# Patient Record
Sex: FEMALE | Race: BLACK OR AFRICAN AMERICAN | URBAN - METROPOLITAN AREA
[De-identification: names, ages, dates, MRNs, and addresses within clinical notes are randomized per-mention and may not be internally consistent; named-entity substitution may affect disease eponyms.]

---

## 2017-05-22 ENCOUNTER — OUTPATIENT (OUTPATIENT)
Dept: OUTPATIENT SERVICES | Facility: HOSPITAL | Age: 52
LOS: 1 days | Discharge: HOME | End: 2017-05-22

## 2017-05-22 ENCOUNTER — APPOINTMENT (OUTPATIENT)
Dept: OBGYN | Facility: CLINIC | Age: 52
End: 2017-05-22

## 2017-05-22 VITALS
BODY MASS INDEX: 30.61 KG/M2 | WEIGHT: 195 LBS | SYSTOLIC BLOOD PRESSURE: 110 MMHG | DIASTOLIC BLOOD PRESSURE: 70 MMHG | HEIGHT: 67 IN

## 2017-05-26 ENCOUNTER — RESULT REVIEW (OUTPATIENT)
Age: 52
End: 2017-05-26

## 2017-06-01 LAB — HPV E6+E7 MRNA CVX QL NAA+PROBE: NOT DETECTED

## 2017-06-28 DIAGNOSIS — Z01.419 ENCOUNTER FOR GYNECOLOGICAL EXAMINATION (GENERAL) (ROUTINE) WITHOUT ABNORMAL FINDINGS: ICD-10-CM

## 2018-06-12 ENCOUNTER — OUTPATIENT (OUTPATIENT)
Dept: OUTPATIENT SERVICES | Facility: HOSPITAL | Age: 53
LOS: 1 days | Discharge: HOME | End: 2018-06-12

## 2018-06-12 DIAGNOSIS — Z12.31 ENCOUNTER FOR SCREENING MAMMOGRAM FOR MALIGNANT NEOPLASM OF BREAST: ICD-10-CM

## 2018-06-20 ENCOUNTER — OUTPATIENT (OUTPATIENT)
Dept: OUTPATIENT SERVICES | Facility: HOSPITAL | Age: 53
LOS: 1 days | Discharge: HOME | End: 2018-06-20

## 2018-06-20 DIAGNOSIS — R92.8 OTHER ABNORMAL AND INCONCLUSIVE FINDINGS ON DIAGNOSTIC IMAGING OF BREAST: ICD-10-CM

## 2020-07-23 ENCOUNTER — APPOINTMENT (OUTPATIENT)
Dept: INTERNAL MEDICINE | Facility: CLINIC | Age: 55
End: 2020-07-23
Payer: MEDICARE

## 2020-07-23 ENCOUNTER — OUTPATIENT (OUTPATIENT)
Dept: OUTPATIENT SERVICES | Facility: HOSPITAL | Age: 55
LOS: 1 days | Discharge: HOME | End: 2020-07-23

## 2020-07-23 VITALS
HEIGHT: 67 IN | HEART RATE: 76 BPM | RESPIRATION RATE: 16 BRPM | DIASTOLIC BLOOD PRESSURE: 86 MMHG | SYSTOLIC BLOOD PRESSURE: 124 MMHG | WEIGHT: 160 LBS | BODY MASS INDEX: 25.11 KG/M2

## 2020-07-23 PROCEDURE — 99212 OFFICE O/P EST SF 10 MIN: CPT | Mod: GC

## 2020-07-23 RX ORDER — LEVOTHYROXINE SODIUM 137 UG/1
TABLET ORAL
Refills: 0 | Status: DISCONTINUED | COMMUNITY
End: 2020-07-23

## 2020-07-23 NOTE — REVIEW OF SYSTEMS
[Abdominal Pain] : abdominal pain [Constipation] : constipation [Dizziness] : dizziness [Nl] : Respiratory

## 2020-08-05 LAB
25(OH)D3 SERPL-MCNC: 63 NG/ML
ALBUMIN SERPL ELPH-MCNC: 4.3 G/DL
ALP BLD-CCNC: 58 U/L
ALT SERPL-CCNC: 10 U/L
ANION GAP SERPL CALC-SCNC: 17 MMOL/L
AST SERPL-CCNC: 15 U/L
BASOPHILS # BLD AUTO: 0.09 K/UL
BASOPHILS NFR BLD AUTO: 0.9 %
BILIRUB SERPL-MCNC: 0.4 MG/DL
BUN SERPL-MCNC: 14 MG/DL
CALCIUM SERPL-MCNC: 9.4 MG/DL
CHLORIDE SERPL-SCNC: 100 MMOL/L
CHOLEST SERPL-MCNC: 158 MG/DL
CHOLEST/HDLC SERPL: 2.1 RATIO
CO2 SERPL-SCNC: 24 MMOL/L
CREAT SERPL-MCNC: 0.8 MG/DL
EOSINOPHIL # BLD AUTO: 0.15 K/UL
EOSINOPHIL NFR BLD AUTO: 1.5 %
ESTIMATED AVERAGE GLUCOSE: 108 MG/DL
GLUCOSE SERPL-MCNC: 85 MG/DL
HBA1C MFR BLD HPLC: 5.4 %
HCT VFR BLD CALC: 40 %
HDLC SERPL-MCNC: 75 MG/DL
HGB BLD-MCNC: 12.3 G/DL
HPV HIGH+LOW RISK DNA PNL CVX: NOT DETECTED
IMM GRANULOCYTES NFR BLD AUTO: 0.4 %
LDLC SERPL CALC-MCNC: 79 MG/DL
LYMPHOCYTES # BLD AUTO: 2.6 K/UL
LYMPHOCYTES NFR BLD AUTO: 26 %
MAN DIFF?: NORMAL
MCHC RBC-ENTMCNC: 29.1 PG
MCHC RBC-ENTMCNC: 30.8 G/DL
MCV RBC AUTO: 94.6 FL
MONOCYTES # BLD AUTO: 0.63 K/UL
MONOCYTES NFR BLD AUTO: 6.3 %
NEUTROPHILS # BLD AUTO: 6.5 K/UL
NEUTROPHILS NFR BLD AUTO: 64.9 %
PLATELET # BLD AUTO: 294 K/UL
POTASSIUM SERPL-SCNC: 4.1 MMOL/L
PROT SERPL-MCNC: 7.1 G/DL
RBC # BLD: 4.23 M/UL
RBC # FLD: 12.9 %
SODIUM SERPL-SCNC: 141 MMOL/L
TRIGL SERPL-MCNC: 55 MG/DL
TSH SERPL-ACNC: 0.14 UIU/ML
WBC # FLD AUTO: 10.01 K/UL

## 2020-09-03 NOTE — HISTORY OF PRESENT ILLNESS
[Menstrual Problems] : reports abnormal menses [Up to Date] : up to date with ~his/her~ STD screening [Currently In Menopause] : currently in menopause [Fever] : no fever [Vaginal Bleeding] : no vaginal bleeding [Burning] : no burning [Itching] : no itching [Hot Flashes] : no hot flashes [Night Sweats] : no night sweats [Vaginal Itching] : no vaginal itching [Dyspareunia] : no dyspareunia [Depression] : no depression [Anxiety] : no anxiety

## 2021-01-08 ENCOUNTER — APPOINTMENT (OUTPATIENT)
Dept: INTERNAL MEDICINE | Facility: CLINIC | Age: 56
End: 2021-01-08
Payer: COMMERCIAL

## 2021-01-08 ENCOUNTER — OUTPATIENT (OUTPATIENT)
Dept: OUTPATIENT SERVICES | Facility: HOSPITAL | Age: 56
LOS: 1 days | Discharge: HOME | End: 2021-01-08
Payer: MEDICAID

## 2021-01-08 ENCOUNTER — OUTPATIENT (OUTPATIENT)
Dept: OUTPATIENT SERVICES | Facility: HOSPITAL | Age: 56
LOS: 1 days | Discharge: HOME | End: 2021-01-08

## 2021-01-08 ENCOUNTER — RESULT REVIEW (OUTPATIENT)
Age: 56
End: 2021-01-08

## 2021-01-08 VITALS
TEMPERATURE: 98.2 F | HEIGHT: 67 IN | OXYGEN SATURATION: 98 % | BODY MASS INDEX: 25.27 KG/M2 | DIASTOLIC BLOOD PRESSURE: 87 MMHG | SYSTOLIC BLOOD PRESSURE: 128 MMHG | WEIGHT: 161 LBS | HEART RATE: 64 BPM

## 2021-01-08 DIAGNOSIS — N89.8 OTHER SPECIFIED NONINFLAMMATORY DISORDERS OF VAGINA: ICD-10-CM

## 2021-01-08 DIAGNOSIS — Z12.31 ENCOUNTER FOR SCREENING MAMMOGRAM FOR MALIGNANT NEOPLASM OF BREAST: ICD-10-CM

## 2021-01-08 PROCEDURE — 77063 BREAST TOMOSYNTHESIS BI: CPT | Mod: 26

## 2021-01-08 PROCEDURE — 77067 SCR MAMMO BI INCL CAD: CPT | Mod: 26

## 2021-01-08 PROCEDURE — 99213 OFFICE O/P EST LOW 20 MIN: CPT | Mod: GC

## 2021-01-08 RX ADMIN — DIPHENHYDRAMINE HYDROCHLORIDE 1 MG: 25 TABLET, FILM COATED ORAL at 00:00

## 2021-01-08 NOTE — ASSESSMENT
[FreeTextEntry1] : 56 yo female with history of thyroidectomy s/p hypothyroidism, asthma, uterine fibroids sp hysterectomy\par presented to the clinic for routine health care\par \par \par # Hypothyroidism\par - on levothyroxine 125 micrograms\par - TSH 0.14 july 2020\par - repeat TFTs\par - endocrin referral\par \par # Vit D deficiency \par -on supplement; stopped\par - Vit D normal\par \par # history of childhood asthma\par \par - not on current treatment\par - no symptoms currently\par \par \par Lipid panel HbA1c normal\par Declined vaccination\par Mammogram referral\par Gi referral for colonoscopy

## 2021-01-08 NOTE — HISTORY OF PRESENT ILLNESS
[Menstrual Problems] : reports abnormal menses [Up to Date] : up to date with ~his/her~ STD screening [Currently In Menopause] : currently in menopause [de-identified] : 56 yo female with history of thyroidectomy s/p hypothyroidism, asthma, uterine fibroids sp hysterectomy\par presented to the clinic for routine health care\par Patient is feeling well except for mild headache and constipation [Fever] : no fever [Vaginal Bleeding] : no vaginal bleeding [Burning] : no burning [Itching] : no itching [Hot Flashes] : no hot flashes [Night Sweats] : no night sweats [Vaginal Itching] : no vaginal itching [Dyspareunia] : no dyspareunia [Depression] : no depression [Anxiety] : no anxiety

## 2021-01-08 NOTE — REVIEW OF SYSTEMS
[Abdominal Pain] : abdominal pain [Dizziness] : dizziness [Nl] : Neurological [Constipation] : constipation [Headache] : headache [Negative] : Musculoskeletal [FreeTextEntry8] : vaginal itching

## 2021-01-12 DIAGNOSIS — E03.9 HYPOTHYROIDISM, UNSPECIFIED: ICD-10-CM

## 2021-01-12 DIAGNOSIS — J45.909 UNSPECIFIED ASTHMA, UNCOMPLICATED: ICD-10-CM

## 2021-01-12 DIAGNOSIS — E55.9 VITAMIN D DEFICIENCY, UNSPECIFIED: ICD-10-CM

## 2021-01-12 DIAGNOSIS — Z00.00 ENCOUNTER FOR GENERAL ADULT MEDICAL EXAMINATION WITHOUT ABNORMAL FINDINGS: ICD-10-CM

## 2021-01-12 DIAGNOSIS — G47.00 INSOMNIA, UNSPECIFIED: ICD-10-CM

## 2021-01-19 ENCOUNTER — TRANSCRIPTION ENCOUNTER (OUTPATIENT)
Age: 56
End: 2021-01-19

## 2021-01-19 ENCOUNTER — APPOINTMENT (OUTPATIENT)
Dept: GASTROENTEROLOGY | Facility: CLINIC | Age: 56
End: 2021-01-19
Payer: COMMERCIAL

## 2021-01-19 LAB
ALBUMIN SERPL ELPH-MCNC: 4.6 G/DL
ALP BLD-CCNC: 47 U/L
ALT SERPL-CCNC: 16 U/L
ANION GAP SERPL CALC-SCNC: 11 MMOL/L
AST SERPL-CCNC: 21 U/L
BILIRUB SERPL-MCNC: 0.4 MG/DL
BUN SERPL-MCNC: 16 MG/DL
CALCIUM SERPL-MCNC: 9.7 MG/DL
CHLORIDE SERPL-SCNC: 103 MMOL/L
CO2 SERPL-SCNC: 25 MMOL/L
CREAT SERPL-MCNC: 0.9 MG/DL
ESTIMATED AVERAGE GLUCOSE: 111 MG/DL
GLUCOSE SERPL-MCNC: 91 MG/DL
HBA1C MFR BLD HPLC: 5.5 %
POTASSIUM SERPL-SCNC: 4.4 MMOL/L
PROT SERPL-MCNC: 7.2 G/DL
SODIUM SERPL-SCNC: 139 MMOL/L
T3FREE SERPL-MCNC: 2.88 PG/ML
T4 FREE SERPL-MCNC: 2 NG/DL
TSH SERPL-ACNC: 0.18 UIU/ML

## 2021-01-19 PROCEDURE — 99213 OFFICE O/P EST LOW 20 MIN: CPT | Mod: 95,GE

## 2021-01-19 RX ORDER — POLYETHYLENE GLYCOL 3350 17 G/17G
17 POWDER, FOR SOLUTION ORAL TWICE DAILY
Qty: 60 | Refills: 4 | Status: ACTIVE | COMMUNITY
Start: 2021-01-19 | End: 1900-01-01

## 2021-01-19 NOTE — HISTORY OF PRESENT ILLNESS
[de-identified] : 54 yo F average risk for CRC. Previous colonoscopy ~9 years ago not done at [facility] byt reports polyps "but normal" ?non adenomatous?\par Denies weight loss, hematochezia, hematemesis, dysphagia,GERD.\par States constipation every once in a while.\par No family Hx of CRC.\par Labs reviewed\par No DAPT

## 2021-01-19 NOTE — ASSESSMENT
[FreeTextEntry1] : 56 yo F average risks CRC? vs low risk adenoma? unclear prior colonoscopy findings. Constipation.\par \par Plan:\par - Colonoscopy\par - Miralax 17 G Qd to BID\par - Follow up post colonoscopy

## 2021-01-20 ENCOUNTER — OUTPATIENT (OUTPATIENT)
Dept: OUTPATIENT SERVICES | Facility: HOSPITAL | Age: 56
LOS: 1 days | Discharge: HOME | End: 2021-01-20

## 2021-01-20 DIAGNOSIS — K59.00 CONSTIPATION, UNSPECIFIED: ICD-10-CM

## 2021-02-05 ENCOUNTER — NON-APPOINTMENT (OUTPATIENT)
Age: 56
End: 2021-02-05

## 2021-02-18 ENCOUNTER — NON-APPOINTMENT (OUTPATIENT)
Age: 56
End: 2021-02-18

## 2021-03-08 ENCOUNTER — APPOINTMENT (OUTPATIENT)
Dept: INTERNAL MEDICINE | Facility: CLINIC | Age: 56
End: 2021-03-08

## 2021-03-08 LAB
BASOPHILS # BLD AUTO: 0.09 K/UL
BASOPHILS NFR BLD AUTO: 1.2 %
EOSINOPHIL # BLD AUTO: 0.11 K/UL
EOSINOPHIL NFR BLD AUTO: 1.5 %
HCT VFR BLD CALC: 40.2 %
HGB BLD-MCNC: 13.1 G/DL
IMM GRANULOCYTES NFR BLD AUTO: 0.1 %
LYMPHOCYTES # BLD AUTO: 2.32 K/UL
LYMPHOCYTES NFR BLD AUTO: 31.4 %
MAN DIFF?: NORMAL
MCHC RBC-ENTMCNC: 29.1 PG
MCHC RBC-ENTMCNC: 32.6 G/DL
MCV RBC AUTO: 89.3 FL
MONOCYTES # BLD AUTO: 0.41 K/UL
MONOCYTES NFR BLD AUTO: 5.6 %
NEUTROPHILS # BLD AUTO: 4.44 K/UL
NEUTROPHILS NFR BLD AUTO: 60.2 %
PLATELET # BLD AUTO: 311 K/UL
RBC # BLD: 4.5 M/UL
RBC # FLD: 12.9 %
WBC # FLD AUTO: 7.38 K/UL

## 2021-03-10 ENCOUNTER — OUTPATIENT (OUTPATIENT)
Dept: OUTPATIENT SERVICES | Facility: HOSPITAL | Age: 56
LOS: 1 days | Discharge: HOME | End: 2021-03-10

## 2021-03-10 ENCOUNTER — APPOINTMENT (OUTPATIENT)
Dept: ENDOCRINOLOGY | Facility: CLINIC | Age: 56
End: 2021-03-10

## 2021-03-10 NOTE — HISTORY OF PRESENT ILLNESS
[FreeTextEntry1] : patient had a total thyroidectomy in the past, posssibly at [facility].

## 2021-03-10 NOTE — REASON FOR VISIT
[Consultation] : a consultation visit [Hypothyroidism] : hypothyroidism [FreeTextEntry2] : primary care.

## 2021-03-11 LAB — TSH SERPL-ACNC: 0.47 UIU/ML

## 2021-03-15 ENCOUNTER — OUTPATIENT (OUTPATIENT)
Dept: OUTPATIENT SERVICES | Facility: HOSPITAL | Age: 56
LOS: 1 days | Discharge: HOME | End: 2021-03-15

## 2021-03-15 ENCOUNTER — LABORATORY RESULT (OUTPATIENT)
Age: 56
End: 2021-03-15

## 2021-03-15 DIAGNOSIS — Z11.59 ENCOUNTER FOR SCREENING FOR OTHER VIRAL DISEASES: ICD-10-CM

## 2021-03-18 ENCOUNTER — RESULT REVIEW (OUTPATIENT)
Age: 56
End: 2021-03-18

## 2021-03-18 ENCOUNTER — TRANSCRIPTION ENCOUNTER (OUTPATIENT)
Age: 56
End: 2021-03-18

## 2021-03-18 ENCOUNTER — OUTPATIENT (OUTPATIENT)
Dept: OUTPATIENT SERVICES | Facility: HOSPITAL | Age: 56
LOS: 1 days | Discharge: HOME | End: 2021-03-18
Payer: COMMERCIAL

## 2021-03-18 VITALS
DIASTOLIC BLOOD PRESSURE: 85 MMHG | RESPIRATION RATE: 18 BRPM | OXYGEN SATURATION: 100 % | SYSTOLIC BLOOD PRESSURE: 157 MMHG | HEART RATE: 66 BPM

## 2021-03-18 VITALS
HEIGHT: 66 IN | RESPIRATION RATE: 18 BRPM | SYSTOLIC BLOOD PRESSURE: 121 MMHG | HEART RATE: 106 BPM | WEIGHT: 153 LBS | TEMPERATURE: 98 F | DIASTOLIC BLOOD PRESSURE: 84 MMHG

## 2021-03-18 DIAGNOSIS — Z90.711 ACQUIRED ABSENCE OF UTERUS WITH REMAINING CERVICAL STUMP: Chronic | ICD-10-CM

## 2021-03-18 DIAGNOSIS — E89.0 POSTPROCEDURAL HYPOTHYROIDISM: Chronic | ICD-10-CM

## 2021-03-18 PROCEDURE — 45385 COLONOSCOPY W/LESION REMOVAL: CPT

## 2021-03-18 PROCEDURE — 88305 TISSUE EXAM BY PATHOLOGIST: CPT | Mod: 26

## 2021-03-18 PROCEDURE — 45380 COLONOSCOPY AND BIOPSY: CPT | Mod: XU

## 2021-03-18 RX ORDER — LEVOTHYROXINE SODIUM 125 MCG
1 TABLET ORAL
Qty: 0 | Refills: 0 | DISCHARGE

## 2021-03-18 NOTE — H&P PST ADULT - HISTORY OF PRESENT ILLNESS
55 year female patient is here for colonoscopy , patient mentioned intermittent streaks of  fresh blood per rectum for several weeks

## 2021-03-18 NOTE — CHART NOTE - NSCHARTNOTEFT_GEN_A_CORE
PACU ANESTHESIA ADMISSION NOTE      Procedure: colonoscopy  Post op diagnosis:      ____  Intubated  TV:______       Rate: ______      FiO2: ______    _x___  Patent Airway    _x___  Full return of protective reflexes    _x___  Full recovery from anesthesia / back to baseline status    Vitals  SPO2:-98% on room air  HR:-88  RR:-14  B.P:-118/78      Mental Status:  _x___ Awake   ___x_ Alert   _____ Drowsy   _____ Sedated    Nausea/Vomiting:  _x___  NO       ______Yes,   See Post - Op Orders         Pain Scale (0-10):  __0___    Treatment: _x___ None    __x__ See Post - Op/PCA Orders    Post - Operative Fluids:   ___ Oral   ____x See Post - Op Orders    Plan: Discharge:   __x__Home       ____Floor     _____Critical Care    _____  Other:_________________    Comments:  Report endorsed to RN in pacu  Vitals stable  No anesthesia issues or complications noted.  Discharge to patient to / home when criteria met.

## 2021-03-19 ENCOUNTER — APPOINTMENT (OUTPATIENT)
Dept: INTERNAL MEDICINE | Facility: CLINIC | Age: 56
End: 2021-03-19
Payer: COMMERCIAL

## 2021-03-19 ENCOUNTER — OUTPATIENT (OUTPATIENT)
Dept: OUTPATIENT SERVICES | Facility: HOSPITAL | Age: 56
LOS: 1 days | Discharge: HOME | End: 2021-03-19

## 2021-03-19 ENCOUNTER — NON-APPOINTMENT (OUTPATIENT)
Age: 56
End: 2021-03-19

## 2021-03-19 DIAGNOSIS — Z90.711 ACQUIRED ABSENCE OF UTERUS WITH REMAINING CERVICAL STUMP: Chronic | ICD-10-CM

## 2021-03-19 DIAGNOSIS — E89.0 POSTPROCEDURAL HYPOTHYROIDISM: Chronic | ICD-10-CM

## 2021-03-19 PROBLEM — E03.9 HYPOTHYROIDISM, UNSPECIFIED: Chronic | Status: ACTIVE | Noted: 2021-03-18

## 2021-03-19 PROCEDURE — ZZZZZ: CPT

## 2021-03-19 RX ORDER — TERCONAZOLE 4 MG/G
0.4 CREAM VAGINAL
Qty: 1 | Refills: 1 | Status: DISCONTINUED | COMMUNITY
Start: 2021-01-08 | End: 2021-03-19

## 2021-03-19 NOTE — DISCUSSION/SUMMARY
[Home] : at home, [unfilled] , at the time of the visit. [Medical Office: ([facility])___] : at the medical office located in  [Verbal consent obtained from patient] : the patient, [unfilled] [FreeTextEntry1] : 56 yo female with history of thyroidectomy s/p hypothyroidism, asthma, uterine fibroids sp hysterectomy\par called from clinic for scheduled follow up.  Patient reports she is doing well. Reports she had coloscopy 3/18 and was told 3 polyps were removed and had 3 large hemorrhoids with virtual follow up 4/9/21. Scheduled for Dr. Holland for hemorrhoids surgery evaluation 4/6/21. \par \par Patient reports she has had a cold sore on nose that she has had since she was young.\par \par She is reporting a sulfur medication which cause her to break out. she reports she does not experience anaphylaxis or closed throat. \par \par 56 yo female with history of thyroidectomy s/p hypothyroidism, asthma, uterine fibroids s/p hysterectomy\par presented to the clinic for routine health care. \par \par # Hypothyroidism\par - on levothyroxine 112 micrograms\par - TSH 0.47 3/10/21\par - endocrine: labs, f/u with pmd\par - repeat TFT\par \par # Vit D deficiency \par -s/p supplement\par -Vit D normal july 2020\par - repeat labs\par \par # history of childhood asthma\par - not on current treatment\par - no symptoms currently\par \par # HCM\par - 7/20: CBC, CMP WNL, RqV7L=1.5\par - f/u repeat blood work: CBC, CMP, TSH, FT4, Lipid, HbA1C\par - Mammography Jan 2021: birads 2, f/u q yearly \par - seen GI Jan 2021, planned Colonoscopy\par - Declined vaccination\par - RTC 3-4 months

## 2021-03-23 LAB — SURGICAL PATHOLOGY STUDY: SIGNIFICANT CHANGE UP

## 2021-03-24 DIAGNOSIS — K92.1 MELENA: ICD-10-CM

## 2021-03-24 DIAGNOSIS — Z88.2 ALLERGY STATUS TO SULFONAMIDES: ICD-10-CM

## 2021-03-24 DIAGNOSIS — Z90.710 ACQUIRED ABSENCE OF BOTH CERVIX AND UTERUS: ICD-10-CM

## 2021-03-24 DIAGNOSIS — K57.30 DIVERTICULOSIS OF LARGE INTESTINE WITHOUT PERFORATION OR ABSCESS WITHOUT BLEEDING: ICD-10-CM

## 2021-03-24 DIAGNOSIS — K64.8 OTHER HEMORRHOIDS: ICD-10-CM

## 2021-03-24 DIAGNOSIS — K52.9 NONINFECTIVE GASTROENTERITIS AND COLITIS, UNSPECIFIED: ICD-10-CM

## 2021-03-24 DIAGNOSIS — J45.909 UNSPECIFIED ASTHMA, UNCOMPLICATED: ICD-10-CM

## 2021-03-24 DIAGNOSIS — E03.9 HYPOTHYROIDISM, UNSPECIFIED: ICD-10-CM

## 2021-04-09 ENCOUNTER — APPOINTMENT (OUTPATIENT)
Dept: GASTROENTEROLOGY | Facility: CLINIC | Age: 56
End: 2021-04-09
Payer: COMMERCIAL

## 2021-04-09 ENCOUNTER — OUTPATIENT (OUTPATIENT)
Dept: OUTPATIENT SERVICES | Facility: HOSPITAL | Age: 56
LOS: 1 days | Discharge: HOME | End: 2021-04-09

## 2021-04-09 DIAGNOSIS — E89.0 POSTPROCEDURAL HYPOTHYROIDISM: Chronic | ICD-10-CM

## 2021-04-09 DIAGNOSIS — Z90.711 ACQUIRED ABSENCE OF UTERUS WITH REMAINING CERVICAL STUMP: Chronic | ICD-10-CM

## 2021-04-09 DIAGNOSIS — K63.5 POLYP OF COLON: ICD-10-CM

## 2021-04-09 PROCEDURE — ZZZZZ: CPT

## 2021-04-09 NOTE — REVIEW OF SYSTEMS
[Fever] : fever [Chills] : no chills [Chest Pain] : no chest pain [Palpitations] : no palpitations [Shortness Of Breath] : no shortness of breath [Abdominal Pain] : no abdominal pain [Vomiting] : no vomiting [Constipation] : no constipation [Diarrhea] : no diarrhea [Dysuria] : no dysuria

## 2021-04-09 NOTE — HISTORY OF PRESENT ILLNESS
[Home] : at home, [unfilled] , at the time of the visit. [Medical Office: ([facility])___] : at the medical office located in  [Verbal consent obtained from patient] : the patient, [unfilled] [Heartburn] : denies heartburn [Nausea] : denies nausea [Vomiting] : denies vomiting [Diarrhea] : denies diarrhea [Constipation] : stable constipation [Abdominal Pain] : denies abdominal pain [Appendectomy] : no appendectomy [Cholecystectomy] : no cholecystectomy [de-identified] : 54 yo female with history of  hypothyroidism, asthma, uterine fibroids s/p hysterectomy. \par Previously seen at GI clinic for screening colonoscopy: \par \par This was a telephone visit and physical exam not performed. \par \par Colonoscopy done on 3/21:  Diverticulosis of the whole colon.  \par  Polyp (5 mm) in the ascending colon. (Polypectomy).  \par  Polyp (7 mm) in the ascending colon. (Polypectomy).  \par  Polyp (7 mm) in the sigmoid colon. (Polypectomy).  \par  The procedure was difficult secondary to adhesions and tortuous colon. \par \par Pathology only showing chronic and active inflammation. \par \par She reports chronic constipation: 3x/week.  \par

## 2021-04-12 DIAGNOSIS — K57.90 DIVERTICULOSIS OF INTESTINE, PART UNSPECIFIED, WITHOUT PERFORATION OR ABSCESS WITHOUT BLEEDING: ICD-10-CM

## 2021-04-12 DIAGNOSIS — K59.00 CONSTIPATION, UNSPECIFIED: ICD-10-CM

## 2021-04-12 DIAGNOSIS — K63.5 POLYP OF COLON: ICD-10-CM

## 2021-04-22 ENCOUNTER — APPOINTMENT (OUTPATIENT)
Dept: SURGERY | Facility: CLINIC | Age: 56
End: 2021-04-22
Payer: COMMERCIAL

## 2021-04-22 VITALS
TEMPERATURE: 96.6 F | BODY MASS INDEX: 24.96 KG/M2 | WEIGHT: 159 LBS | SYSTOLIC BLOOD PRESSURE: 120 MMHG | HEIGHT: 67 IN | HEART RATE: 91 BPM | DIASTOLIC BLOOD PRESSURE: 84 MMHG

## 2021-04-22 DIAGNOSIS — K59.00 CONSTIPATION, UNSPECIFIED: ICD-10-CM

## 2021-04-22 DIAGNOSIS — K64.9 UNSPECIFIED HEMORRHOIDS: ICD-10-CM

## 2021-04-22 PROCEDURE — 46600 DIAGNOSTIC ANOSCOPY SPX: CPT

## 2021-04-22 PROCEDURE — 99072 ADDL SUPL MATRL&STAF TM PHE: CPT

## 2021-04-22 PROCEDURE — 99203 OFFICE O/P NEW LOW 30 MIN: CPT | Mod: 25

## 2021-04-22 NOTE — ASSESSMENT
[FreeTextEntry1] : Ms. WILL has some mild internal as well as external hemorrhoids. I believe since she is relatively asymptomatic we should be able to deal with these with dietary modification. I advised her to increase D. fiber in her diet by increasing pressures fresh vessels hold ratios are breads. She can also add a fiber supplement if necessary. I've advised her to increase her fiber to 25-35 g of fiber diet. Fiber she was given to her. In addition I cautioned her to make sure she increases her fluid consumption particularly water. I've advised her that if she increases her fiber consumption without increasing the water that her problem will worsen. She states that she understands and will comply. She'll return to see me in 3-4 weeks.

## 2021-04-22 NOTE — PROCEDURE
[FreeTextEntry1] : Anoscopy performed using a disposable anoscope.  The patient was place in the left lateral decubitus position. After DARYL was performed the anoscope was inserted and the distal rectum was evaluated along with the anal canal and anal margin.\par \par Findings:\par Examination of the perineum reveals mild perianal skin tags is mild perianal excoriation is no significant erythema induration or drainage or bleeding there are no other lesions.\par \par DARYL, good tone, no palpable mass.\par \par Anoscopy, grade 2-3 internal hemorrhoids, no evidence of bleeding.

## 2021-04-22 NOTE — HISTORY OF PRESENT ILLNESS
[FreeTextEntry1] : This is a new patient visit for Ms. NICOLETTE Chanel has complaint of hemorrhoids. His NICOLETTE states that her hemorrhoids are only moderately symptomatic. She states that she mainly has constipation. She recently had a colonoscopy which demonstrated internal as well as external hemorrhoids and was advised to see the surgeon. She denies any pain with defecation and or bleeding. She has no family history of colon or rectal cancer.

## 2021-05-12 ENCOUNTER — APPOINTMENT (OUTPATIENT)
Dept: ENDOCRINOLOGY | Facility: CLINIC | Age: 56
End: 2021-05-12

## 2021-05-27 ENCOUNTER — APPOINTMENT (OUTPATIENT)
Dept: SURGERY | Facility: CLINIC | Age: 56
End: 2021-05-27

## 2022-01-01 ENCOUNTER — RX RENEWAL (OUTPATIENT)
Age: 57
End: 2022-01-01

## 2022-03-09 ENCOUNTER — APPOINTMENT (OUTPATIENT)
Dept: ENDOCRINOLOGY | Facility: CLINIC | Age: 57
End: 2022-03-09

## 2022-06-03 ENCOUNTER — APPOINTMENT (OUTPATIENT)
Dept: INTERNAL MEDICINE | Facility: CLINIC | Age: 57
End: 2022-06-03
Payer: COMMERCIAL

## 2022-06-03 ENCOUNTER — OUTPATIENT (OUTPATIENT)
Dept: OUTPATIENT SERVICES | Facility: HOSPITAL | Age: 57
LOS: 1 days | Discharge: HOME | End: 2022-06-03

## 2022-06-03 ENCOUNTER — NON-APPOINTMENT (OUTPATIENT)
Age: 57
End: 2022-06-03

## 2022-06-03 VITALS
TEMPERATURE: 97.6 F | BODY MASS INDEX: 24.64 KG/M2 | HEIGHT: 67 IN | HEART RATE: 70 BPM | OXYGEN SATURATION: 98 % | SYSTOLIC BLOOD PRESSURE: 114 MMHG | WEIGHT: 157 LBS | DIASTOLIC BLOOD PRESSURE: 79 MMHG

## 2022-06-03 DIAGNOSIS — E89.0 POSTPROCEDURAL HYPOTHYROIDISM: Chronic | ICD-10-CM

## 2022-06-03 DIAGNOSIS — Z90.711 ACQUIRED ABSENCE OF UTERUS WITH REMAINING CERVICAL STUMP: Chronic | ICD-10-CM

## 2022-06-03 PROCEDURE — 99396 PREV VISIT EST AGE 40-64: CPT | Mod: GC

## 2022-06-03 RX ORDER — POLYETHYLENE GLYCOL 3350 AND ELECTROLYTES WITH LEMON FLAVOR 236; 22.74; 6.74; 5.86; 2.97 G/4L; G/4L; G/4L; G/4L; G/4L
236 POWDER, FOR SOLUTION ORAL
Qty: 1 | Refills: 0 | Status: DISCONTINUED | COMMUNITY
Start: 2021-01-19 | End: 2022-06-03

## 2022-06-03 NOTE — ASSESSMENT
[FreeTextEntry1] : 55yo F hx of hypothyroidism, childhood asthma presenting for complete physical exam.\par \par #Lymphadenopathy\par #left breast tenderness\par - Unsure of etiology, No fevers, Has not had sexual encounter in years and has remained celibate. No recent sick contacts. Lymph nodes on extremities and axillary are not erythematous, but can be tender. Unlikely to be erythema nodosum\par - Check HIV, LDH, ESR, CRP\par - Check mammogram b/l, and breast US b/l\par \par #Hypothyroidism\par - cont synthroid\par - recheck TSH\par \par #Childhood asthma\par - pulm consult for PFTs to confirm if patient has adult asthma\par - covid 19 PCR for PFTs\par - Exam was unremarkable. \par \par #HCM\par - check CBC, CMP, Mg, A1c, lipid profile, Vit D, UA\par - discussed extensively with patient about medical plan.\par - wrote letter to patient. \par - deferred vaccines for now as patient has active acute complaint. she can  RTC in 1 month with Dr. Boyd to f/u results and schedule for routine vaccination

## 2022-06-03 NOTE — REVIEW OF SYSTEMS
[Headache] : headache [Swollen Glands] : swollen glands [Negative] : Psychiatric [de-identified] : axillary lymphadenopathy b/l

## 2022-06-03 NOTE — PHYSICAL EXAM
[No Acute Distress] : no acute distress [Well Nourished] : well nourished [Well Developed] : well developed [Well-Appearing] : well-appearing [PERRL] : pupils equal round and reactive to light [EOMI] : extraocular movements intact [Normal Outer Ear/Nose] : the outer ears and nose were normal in appearance [Normal Oropharynx] : the oropharynx was normal [Normal TMs] : both tympanic membranes were normal [No JVD] : no jugular venous distention [No Lymphadenopathy] : no lymphadenopathy [Supple] : supple [No Respiratory Distress] : no respiratory distress  [No Accessory Muscle Use] : no accessory muscle use [Clear to Auscultation] : lungs were clear to auscultation bilaterally [Normal Percussion] : the chest was normal to percussion [Normal Rate] : normal rate  [Regular Rhythm] : with a regular rhythm [Normal S1, S2] : normal S1 and S2 [No Murmur] : no murmur heard [No Abdominal Bruit] : a ~M bruit was not heard ~T in the abdomen [No Varicosities] : no varicosities [No Edema] : there was no peripheral edema [No Extremity Clubbing/Cyanosis] : no extremity clubbing/cyanosis [Normal Appearance] : normal in appearance [No Nipple Discharge] : no nipple discharge [Soft] : abdomen soft [Non Tender] : non-tender [Non-distended] : non-distended [No Masses] : no abdominal mass palpated [No HSM] : no HSM [Normal Bowel Sounds] : normal bowel sounds [Normal Supraclavicular Nodes] : no supraclavicular lymphadenopathy [Normal Posterior Cervical Nodes] : no posterior cervical lymphadenopathy [Normal Anterior Cervical Nodes] : no anterior cervical lymphadenopathy [Normal Inguinal Nodes] : no inguinal lymphadenopathy [No Joint Swelling] : no joint swelling [Grossly Normal Strength/Tone] : grossly normal strength/tone [Normal] : no joint swelling and grossly normal strength and tone [No Rash] : no rash [Coordination Grossly Intact] : coordination grossly intact [No Focal Deficits] : no focal deficits [Normal Gait] : normal gait [Deep Tendon Reflexes (DTR)] : deep tendon reflexes were 2+ and symmetric [Speech Grossly Normal] : speech grossly normal [Normal Affect] : the affect was normal [Normal Insight/Judgement] : insight and judgment were intact [de-identified] : has thyroidectomy scar, no thyroid palpated [de-identified] : no wheeze [de-identified] : has tenderness to touch on the left upper breast, 11 o clock, no lymphadenoapthy or nodules palpated on breast exam b/l. however there are multiple axillary lymphadenopathy palpated on exam. Test supervised by attending and Khushboo Wright [de-identified] : axillary lymphadenopathy

## 2022-06-03 NOTE — END OF VISIT
[] : Resident [FreeTextEntry3] : Pt was given diagnosis letter on her request. I also advised pt to f/up with pulmo to have PFTs and proper diagnosis for asthma.

## 2022-06-03 NOTE — HISTORY OF PRESENT ILLNESS
[FreeTextEntry1] : physical exam, lumps [de-identified] : 55 yo female with history of thyroidectomy (2006 and 2007) s/p hypothyroidism, asthma, uterine fibroids s/p hysterectomy\par presented to the clinic for physical exam.\par \par Patient has multiple complaints. Complains of swollen lymph nodes growing across her right cervical, right inguinal area, left leg, b/l axillary region that come sand goes for about a week. She also complains of left breast tenderness since February 2022. She previously has had left breast cyst removal in 2006. She also has had lethargy, headaches 5/10, bandlike that can last a whole day. Has tried Tylenol, but does not relieve the pain. \par Off note: Pt had some forms from her job to get medical accommodation. Pt was asking to write a diagnosis letter.

## 2022-06-06 ENCOUNTER — LABORATORY RESULT (OUTPATIENT)
Age: 57
End: 2022-06-06

## 2022-06-06 DIAGNOSIS — Z00.00 ENCOUNTER FOR GENERAL ADULT MEDICAL EXAMINATION WITHOUT ABNORMAL FINDINGS: ICD-10-CM

## 2022-06-06 DIAGNOSIS — E03.9 HYPOTHYROIDISM, UNSPECIFIED: ICD-10-CM

## 2022-06-06 DIAGNOSIS — R59.1 GENERALIZED ENLARGED LYMPH NODES: ICD-10-CM

## 2022-06-10 ENCOUNTER — OUTPATIENT (OUTPATIENT)
Dept: OUTPATIENT SERVICES | Facility: HOSPITAL | Age: 57
LOS: 1 days | Discharge: HOME | End: 2022-06-10

## 2022-06-10 ENCOUNTER — APPOINTMENT (OUTPATIENT)
Dept: INTERNAL MEDICINE | Facility: CLINIC | Age: 57
End: 2022-06-10
Payer: COMMERCIAL

## 2022-06-10 VITALS
BODY MASS INDEX: 24.64 KG/M2 | TEMPERATURE: 98.7 F | HEART RATE: 78 BPM | WEIGHT: 157 LBS | DIASTOLIC BLOOD PRESSURE: 87 MMHG | HEIGHT: 67 IN | OXYGEN SATURATION: 95 % | SYSTOLIC BLOOD PRESSURE: 144 MMHG

## 2022-06-10 DIAGNOSIS — K57.90 DIVERTICULOSIS OF INTESTINE, PART UNSPECIFIED, W/OUT PERFORATION OR ABSCESS W/OUT BLEEDING: ICD-10-CM

## 2022-06-10 DIAGNOSIS — Z90.711 ACQUIRED ABSENCE OF UTERUS WITH REMAINING CERVICAL STUMP: Chronic | ICD-10-CM

## 2022-06-10 DIAGNOSIS — R59.1 GENERALIZED ENLARGED LYMPH NODES: ICD-10-CM

## 2022-06-10 DIAGNOSIS — E89.0 POSTPROCEDURAL HYPOTHYROIDISM: Chronic | ICD-10-CM

## 2022-06-10 PROCEDURE — 99214 OFFICE O/P EST MOD 30 MIN: CPT | Mod: GC

## 2022-06-15 ENCOUNTER — OUTPATIENT (OUTPATIENT)
Dept: OUTPATIENT SERVICES | Facility: HOSPITAL | Age: 57
LOS: 1 days | Discharge: HOME | End: 2022-06-15

## 2022-06-15 ENCOUNTER — APPOINTMENT (OUTPATIENT)
Dept: ENDOCRINOLOGY | Facility: CLINIC | Age: 57
End: 2022-06-15

## 2022-06-15 VITALS
HEIGHT: 67 IN | DIASTOLIC BLOOD PRESSURE: 83 MMHG | WEIGHT: 157 LBS | HEART RATE: 69 BPM | BODY MASS INDEX: 24.64 KG/M2 | OXYGEN SATURATION: 96 % | SYSTOLIC BLOOD PRESSURE: 144 MMHG | TEMPERATURE: 98.6 F

## 2022-06-15 DIAGNOSIS — Z00.00 ENCOUNTER FOR GENERAL ADULT MEDICAL EXAMINATION WITHOUT ABNORMAL FINDINGS: ICD-10-CM

## 2022-06-15 DIAGNOSIS — Z90.711 ACQUIRED ABSENCE OF UTERUS WITH REMAINING CERVICAL STUMP: Chronic | ICD-10-CM

## 2022-06-15 DIAGNOSIS — E89.0 POSTPROCEDURAL HYPOTHYROIDISM: Chronic | ICD-10-CM

## 2022-06-15 DIAGNOSIS — R59.1 GENERALIZED ENLARGED LYMPH NODES: ICD-10-CM

## 2022-06-15 NOTE — HISTORY OF PRESENT ILLNESS
[FreeTextEntry1] : 55 yo female with history of nodular thyroid? hyperthyroid,  s/p thyroidectomy ( per patient was told only had nodules , no malignancy , asthma, uterine fibroids s/p hysterectomy presenting for fu for hypothyroid. \par on levothyroxine 112mcg qd compliant \par TSH 0.52 , no hyperthyroid symptoms feels ok , no hypocalcemia \par

## 2022-06-15 NOTE — REVIEW OF SYSTEMS
[Fatigue] : fatigue [Negative] : Respiratory [Decreased Appetite] : appetite not decreased [Dysphagia] : no dysphagia [Dysphonia] : no dysphonia [Chest Pain] : no chest pain [Palpitations] : no palpitations [Fast Heart Rate] : heart rate is not fast [Lower Ext Edema] : no lower extremity edema [Headaches] : no headaches [Tremors] : no tremors [Cold Intolerance] : no cold intolerance [Heat Intolerance] : no heat intolerance

## 2022-06-15 NOTE — PHYSICAL EXAM
[Alert] : alert [No Acute Distress] : no acute distress [No Respiratory Distress] : no respiratory distress [Clear to Auscultation] : lungs were clear to auscultation bilaterally [Normal Rate] : heart rate was normal [Regular Rhythm] : with a regular rhythm [Not Tender] : non-tender [Soft] : abdomen soft [Well Healed Scar] : well healed scar [No Edema] : no peripheral edema [No Stigmata of Cushings Syndrome] : no stigmata of Cushings Syndrome [Acanthosis Nigricans] : no acanthosis nigricans [No Tremors] : no tremors [Oriented x3] : oriented to person, place, and time

## 2022-06-15 NOTE — ASSESSMENT
[FreeTextEntry1] : 55 yo female with hx of hyperthyroid/MNG s/p Total thyroidectomy, asthma uterine fibroids, here for fu of hypothyroid. \par \par #post surgical hypothyroidism \par #hypothyroid \par - TSH wnl, on levothyroxine 112mcg qd, continue  same dose, no hyperthyroid symptoms \par \par - can follow up in medical clinic \par \par #prediabetes \par - lifestyle modifications \par \par

## 2022-06-15 NOTE — END OF VISIT
[] : Resident [FreeTextEntry3] : 56 year old lady with post surgical hypothyroidism for MNG ? ( per patient no malignancy on pathology ) , on Lt4 112 mcg daily goo dpill technique and compliant . clinically and biochemically euthyroid , continue same \par - reviewed calcium intake from diet and if not getting enough then can take supplements \par - prediabetes : was eating chocolate on daily basis, will continue, reviewed lifestyle modifications \par \par she can f/u with PCP and if needed refer again  [Time Spent: ___ minutes] : I have spent [unfilled] minutes of time on the encounter.

## 2022-06-17 DIAGNOSIS — R74.02 ELEVATION OF LEVELS OF LACTIC ACID DEHYDROGENASE [LDH]: ICD-10-CM

## 2022-06-17 LAB
25(OH)D3 SERPL-MCNC: 46 NG/ML
ALBUMIN SERPL ELPH-MCNC: 4.4 G/DL
ALP BLD-CCNC: 54 U/L
ALT SERPL-CCNC: 10 U/L
ANION GAP SERPL CALC-SCNC: 15 MMOL/L
APPEARANCE: CLEAR
AST SERPL-CCNC: 18 U/L
BASOPHILS # BLD AUTO: 0.09 K/UL
BASOPHILS NFR BLD AUTO: 1 %
BILIRUB SERPL-MCNC: 0.2 MG/DL
BILIRUBIN URINE: NEGATIVE
BLOOD URINE: NEGATIVE
BUN SERPL-MCNC: 18 MG/DL
CALCIUM SERPL-MCNC: 9.2 MG/DL
CHLORIDE SERPL-SCNC: 102 MMOL/L
CHOLEST SERPL-MCNC: 171 MG/DL
CO2 SERPL-SCNC: 23 MMOL/L
COLOR: YELLOW
CREAT SERPL-MCNC: 1 MG/DL
CRP SERPL-MCNC: 3 MG/L
EGFR: 66 ML/MIN/1.73M2
EOSINOPHIL # BLD AUTO: 0.08 K/UL
EOSINOPHIL NFR BLD AUTO: 0.9 %
ERYTHROCYTE [SEDIMENTATION RATE] IN BLOOD BY WESTERGREN METHOD: 25 MM/HR
ESTIMATED AVERAGE GLUCOSE: 120 MG/DL
GLUCOSE QUALITATIVE U: NEGATIVE
GLUCOSE SERPL-MCNC: 91 MG/DL
HBA1C MFR BLD HPLC: 5.8 %
HCT VFR BLD CALC: 40 %
HDLC SERPL-MCNC: 64 MG/DL
HGB BLD-MCNC: 12.8 G/DL
HIV1+2 AB SPEC QL IA.RAPID: NONREACTIVE
IMM GRANULOCYTES NFR BLD AUTO: 1.2 %
KETONES URINE: NEGATIVE
LDH SERPL-CCNC: 249
LDLC SERPL CALC-MCNC: 96 MG/DL
LEUKOCYTE ESTERASE URINE: ABNORMAL
LYMPHOCYTES # BLD AUTO: 1.87 K/UL
LYMPHOCYTES NFR BLD AUTO: 21.7 %
MAGNESIUM SERPL-MCNC: 2.2 MG/DL
MAN DIFF?: NORMAL
MCHC RBC-ENTMCNC: 29.6 PG
MCHC RBC-ENTMCNC: 32 G/DL
MCV RBC AUTO: 92.4 FL
MONOCYTES # BLD AUTO: 0.37 K/UL
MONOCYTES NFR BLD AUTO: 4.3 %
NEUTROPHILS # BLD AUTO: 6.11 K/UL
NEUTROPHILS NFR BLD AUTO: 70.9 %
NITRITE URINE: NEGATIVE
NONHDLC SERPL-MCNC: 107 MG/DL
PH URINE: 6
PLATELET # BLD AUTO: 424 K/UL
POTASSIUM SERPL-SCNC: 4.4 MMOL/L
PROT SERPL-MCNC: 7.6 G/DL
PROTEIN URINE: NORMAL
RBC # BLD: 4.33 M/UL
RBC # FLD: 12.8 %
SODIUM SERPL-SCNC: 140 MMOL/L
SPECIFIC GRAVITY URINE: 1.02
TRIGL SERPL-MCNC: 55 MG/DL
TSH SERPL-ACNC: 0.52 UIU/ML
UROBILINOGEN URINE: NORMAL
WBC # FLD AUTO: 8.62 K/UL

## 2022-06-21 ENCOUNTER — NON-APPOINTMENT (OUTPATIENT)
Age: 57
End: 2022-06-21

## 2022-06-21 PROBLEM — K57.90 DIVERTICULOSIS: Status: ACTIVE | Noted: 2021-04-09

## 2022-06-21 NOTE — ASSESSMENT
[FreeTextEntry1] : 55 yo female with history of hypothyroidism s/p thyroidectomy, asthma, uterine fibroids s/p hysterectomy presenting for "painful glands"\par \par #Lymphadenopathy\par #left breast tenderness\par - Unsure of etiology, No fevers, Has not had sexual encounter in years and has remained celibate. No recent sick contacts. \par - not present today on exam\par - HIV, LDH, ESR, CRP -> negative\par - mammogram b/l, and breast US b/l pending\par \par #Hypothyroidism\par - cont Synthroid\par - TSH 0.52\par - endocrine scheduled on 06/15/22\par \par # Asthma\par - diagnosed in childhood\par - check PFTs\par - pulmonary c/s\par \par #colon polyps\par - colonoscopy 2021 -> Diverticulosis of the whole colon. Polyp (5 mm) in the ascending colon. Polyp (7 mm) in the ascending colon. Polyp (7 mm) in the sigmoid colon.\par - path -> most likely infectious/self limited colitis\par \par #HCM\par - mammogram 01/21 -> BIRADS2 -> scheduled for 06/22/22\par - PAP smear 2020 -> negative\par - colonoscopy done in 2021 -> next one 2025\par \par \par

## 2022-06-21 NOTE — PHYSICAL EXAM
[Normal] : no joint swelling and grossly normal strength and tone [de-identified] : thyroidectomy scar

## 2022-06-21 NOTE — HISTORY OF PRESENT ILLNESS
[FreeTextEntry1] : follow up c/o lymphadenopathy and breast tenderness [de-identified] : 57 yo female with history of hypothyroidism s/p thyroidectomy, asthma, uterine fibroids s/p hysterectomy presenting for the above chief complaint. patient is still complaining of the same left breast tenderness, comes and goes, but slightly improving

## 2022-06-22 ENCOUNTER — OUTPATIENT (OUTPATIENT)
Dept: OUTPATIENT SERVICES | Facility: HOSPITAL | Age: 57
LOS: 1 days | Discharge: HOME | End: 2022-06-22
Payer: COMMERCIAL

## 2022-06-22 ENCOUNTER — RESULT REVIEW (OUTPATIENT)
Age: 57
End: 2022-06-22

## 2022-06-22 DIAGNOSIS — N64.4 MASTODYNIA: ICD-10-CM

## 2022-06-22 DIAGNOSIS — Z90.711 ACQUIRED ABSENCE OF UTERUS WITH REMAINING CERVICAL STUMP: Chronic | ICD-10-CM

## 2022-06-22 DIAGNOSIS — E89.0 POSTPROCEDURAL HYPOTHYROIDISM: Chronic | ICD-10-CM

## 2022-06-22 PROCEDURE — G0279: CPT | Mod: 26

## 2022-06-22 PROCEDURE — 77066 DX MAMMO INCL CAD BI: CPT | Mod: 26

## 2022-06-22 PROCEDURE — 76642 ULTRASOUND BREAST LIMITED: CPT | Mod: 26,LT

## 2022-09-23 ENCOUNTER — APPOINTMENT (OUTPATIENT)
Dept: INTERNAL MEDICINE | Facility: CLINIC | Age: 57
End: 2022-09-23

## 2023-02-28 ENCOUNTER — APPOINTMENT (OUTPATIENT)
Dept: INTERNAL MEDICINE | Facility: CLINIC | Age: 58
End: 2023-02-28

## 2023-03-29 ENCOUNTER — APPOINTMENT (OUTPATIENT)
Dept: INTERNAL MEDICINE | Facility: CLINIC | Age: 58
End: 2023-03-29

## 2023-04-21 ENCOUNTER — APPOINTMENT (OUTPATIENT)
Dept: INTERNAL MEDICINE | Facility: CLINIC | Age: 58
End: 2023-04-21

## 2023-04-25 ENCOUNTER — APPOINTMENT (OUTPATIENT)
Dept: ORTHOPEDIC SURGERY | Facility: CLINIC | Age: 58
End: 2023-04-25
Payer: COMMERCIAL

## 2023-04-25 ENCOUNTER — NON-APPOINTMENT (OUTPATIENT)
Age: 58
End: 2023-04-25

## 2023-04-25 VITALS — BODY MASS INDEX: 24.64 KG/M2 | HEIGHT: 67 IN | WEIGHT: 157 LBS

## 2023-04-25 DIAGNOSIS — M72.2 PLANTAR FASCIAL FIBROMATOSIS: ICD-10-CM

## 2023-04-25 PROCEDURE — 99203 OFFICE O/P NEW LOW 30 MIN: CPT

## 2023-04-25 PROCEDURE — 73630 X-RAY EXAM OF FOOT: CPT | Mod: LT

## 2023-04-25 NOTE — IMAGING
[de-identified] : She is tender palpation at the origin of the plantar fascia alone.  Nontender elsewhere.  No bony crepitus or tenderness.  Full range of motion.  Neurovascular intact distally

## 2023-04-25 NOTE — DATA REVIEWED
[FreeTextEntry1] : Reviewed the patient's x-rays.  Negative for fracture.  There is a plantar enthesophyte present

## 2023-04-25 NOTE — DISCUSSION/SUMMARY
[de-identified] : Patient with Planter fasciitis of the left foot.  I recommended a course of anti-inflammatory medication along with a home exercise stretching regimen.  I also recommended a silicone heel cup.  I will see her back in as-needed basis.  All questions answered

## 2023-04-25 NOTE — HISTORY OF PRESENT ILLNESS
[de-identified] : 57-year-old patient with left heel pain.  No history of injury.  She localized the pain to the plantar fascia.  She went to urgent care last week secondary to the pain.  Denies any pain elsewhere.  Denies any fevers chills.

## 2023-04-28 ENCOUNTER — APPOINTMENT (OUTPATIENT)
Dept: ORTHOPEDIC SURGERY | Facility: CLINIC | Age: 58
End: 2023-04-28

## 2023-05-22 ENCOUNTER — RX RENEWAL (OUTPATIENT)
Age: 58
End: 2023-05-22

## 2023-06-22 ENCOUNTER — RX RENEWAL (OUTPATIENT)
Age: 58
End: 2023-06-22

## 2023-06-22 ENCOUNTER — APPOINTMENT (OUTPATIENT)
Dept: INTERNAL MEDICINE | Facility: CLINIC | Age: 58
End: 2023-06-22

## 2023-06-30 ENCOUNTER — OUTPATIENT (OUTPATIENT)
Dept: OUTPATIENT SERVICES | Facility: HOSPITAL | Age: 58
LOS: 1 days | End: 2023-06-30
Payer: COMMERCIAL

## 2023-06-30 ENCOUNTER — APPOINTMENT (OUTPATIENT)
Dept: INTERNAL MEDICINE | Facility: CLINIC | Age: 58
End: 2023-06-30

## 2023-06-30 ENCOUNTER — NON-APPOINTMENT (OUTPATIENT)
Age: 58
End: 2023-06-30

## 2023-06-30 VITALS
DIASTOLIC BLOOD PRESSURE: 76 MMHG | WEIGHT: 174 LBS | HEIGHT: 67 IN | HEART RATE: 77 BPM | BODY MASS INDEX: 27.31 KG/M2 | SYSTOLIC BLOOD PRESSURE: 122 MMHG | OXYGEN SATURATION: 98 %

## 2023-06-30 DIAGNOSIS — R07.9 CHEST PAIN, UNSPECIFIED: ICD-10-CM

## 2023-06-30 DIAGNOSIS — Z90.711 ACQUIRED ABSENCE OF UTERUS WITH REMAINING CERVICAL STUMP: Chronic | ICD-10-CM

## 2023-06-30 DIAGNOSIS — E89.0 POSTPROCEDURAL HYPOTHYROIDISM: Chronic | ICD-10-CM

## 2023-06-30 DIAGNOSIS — Z00.00 ENCOUNTER FOR GENERAL ADULT MEDICAL EXAMINATION WITHOUT ABNORMAL FINDINGS: ICD-10-CM

## 2023-06-30 DIAGNOSIS — N64.4 MASTODYNIA: ICD-10-CM

## 2023-06-30 DIAGNOSIS — Z87.891 PERSONAL HISTORY OF NICOTINE DEPENDENCE: ICD-10-CM

## 2023-06-30 PROCEDURE — 71046 X-RAY EXAM CHEST 2 VIEWS: CPT

## 2023-06-30 PROCEDURE — 99214 OFFICE O/P EST MOD 30 MIN: CPT

## 2023-06-30 PROCEDURE — 71046 X-RAY EXAM CHEST 2 VIEWS: CPT | Mod: 26

## 2023-06-30 NOTE — ASSESSMENT
[FreeTextEntry1] : 59 yo female with history of hypothyroidism s/p thyroidectomy, asthma, uterine fibroids s/p hysterectomy, pre-diabetes \par \par # Left breast cyst with some pain\par - mammogram 2022, BIRADS 2 \par - repeat mammogram \par \par \par # Hypothyroidism\par - cont Synthroid\par - TSH 0.52\par - repeat labs \par \par # Asthma\par - diagnosed in childhood\par - currently she has no symptoms, doesn't smoke \par - order chest xray \par - monitor off medications \par \par # colon polyps\par - colonoscopy 2021 -> Diverticulosis of the whole colon. Polyp (5 mm) in the ascending colon. Polyp (7 mm) in the ascending colon. Polyp (7 mm) in the sigmoid colon.\par - path -> most likely infectious/self limited colitis\par - Colonoscopy in 2025\par \par \par #HCM\par - PAP smear 2020 -> negative, will refer to GYN \par - send new labs\par - follow up in 6 months \par \par \par

## 2023-06-30 NOTE — HISTORY OF PRESENT ILLNESS
[FreeTextEntry1] : follow up  [de-identified] : 57 yo female with history of hypothyroidism s/p thyroidectomy, asthma, uterine fibroids s/p hysterectomy, pre-diabetes. \par \par She is complaining from right shoulder pain occasionally, sharp pain, relieved on it's own.

## 2023-06-30 NOTE — PHYSICAL EXAM
[Normal Sclera/Conjunctiva] : normal sclera/conjunctiva [Normal Outer Ear/Nose] : the outer ears and nose were normal in appearance [No Respiratory Distress] : no respiratory distress  [Clear to Auscultation] : lungs were clear to auscultation bilaterally [Normal Rate] : normal rate  [Normal S1, S2] : normal S1 and S2 [No Edema] : there was no peripheral edema [Soft] : abdomen soft [Non Tender] : non-tender [No Rash] : no rash [de-identified] : anterior right shoulder tenderness

## 2023-06-30 NOTE — REVIEW OF SYSTEMS
[Constipation] : constipation [Fever] : no fever [Discharge] : no discharge [Chest Pain] : no chest pain [Palpitations] : no palpitations [Orthopnea] : no orthopnea [Shortness Of Breath] : no shortness of breath [Wheezing] : no wheezing [Cough] : no cough [Abdominal Pain] : no abdominal pain [Nausea] : no nausea [Vomiting] : no vomiting [Dysuria] : no dysuria [Joint Pain] : no joint pain [Muscle Pain] : no muscle pain [Itching] : no itching [Skin Rash] : no skin rash [Headache] : no headache [Dizziness] : no dizziness

## 2023-07-01 DIAGNOSIS — R07.9 CHEST PAIN, UNSPECIFIED: ICD-10-CM

## 2023-07-05 DIAGNOSIS — N64.4 MASTODYNIA: ICD-10-CM

## 2023-07-05 DIAGNOSIS — R73.03 PREDIABETES: ICD-10-CM

## 2023-07-05 DIAGNOSIS — Z87.891 PERSONAL HISTORY OF NICOTINE DEPENDENCE: ICD-10-CM

## 2023-07-05 DIAGNOSIS — E03.9 HYPOTHYROIDISM, UNSPECIFIED: ICD-10-CM

## 2023-07-22 ENCOUNTER — OUTPATIENT (OUTPATIENT)
Dept: OUTPATIENT SERVICES | Facility: HOSPITAL | Age: 58
LOS: 1 days | End: 2023-07-22
Payer: COMMERCIAL

## 2023-07-22 DIAGNOSIS — Z90.711 ACQUIRED ABSENCE OF UTERUS WITH REMAINING CERVICAL STUMP: Chronic | ICD-10-CM

## 2023-07-22 DIAGNOSIS — E89.0 POSTPROCEDURAL HYPOTHYROIDISM: Chronic | ICD-10-CM

## 2023-07-22 PROCEDURE — 80061 LIPID PANEL: CPT

## 2023-07-22 PROCEDURE — 84443 ASSAY THYROID STIM HORMONE: CPT

## 2023-07-22 PROCEDURE — 80053 COMPREHEN METABOLIC PANEL: CPT

## 2023-07-22 PROCEDURE — 82306 VITAMIN D 25 HYDROXY: CPT

## 2023-07-22 PROCEDURE — 85027 COMPLETE CBC AUTOMATED: CPT

## 2023-07-22 PROCEDURE — 83036 HEMOGLOBIN GLYCOSYLATED A1C: CPT

## 2023-07-24 ENCOUNTER — RESULT REVIEW (OUTPATIENT)
Age: 58
End: 2023-07-24

## 2023-07-24 ENCOUNTER — OUTPATIENT (OUTPATIENT)
Dept: OUTPATIENT SERVICES | Facility: HOSPITAL | Age: 58
LOS: 1 days | End: 2023-07-24
Payer: COMMERCIAL

## 2023-07-24 DIAGNOSIS — N64.4 MASTODYNIA: ICD-10-CM

## 2023-07-24 DIAGNOSIS — R92.8 OTHER ABNORMAL AND INCONCLUSIVE FINDINGS ON DIAGNOSTIC IMAGING OF BREAST: ICD-10-CM

## 2023-07-24 DIAGNOSIS — Z90.711 ACQUIRED ABSENCE OF UTERUS WITH REMAINING CERVICAL STUMP: Chronic | ICD-10-CM

## 2023-07-24 DIAGNOSIS — E89.0 POSTPROCEDURAL HYPOTHYROIDISM: Chronic | ICD-10-CM

## 2023-07-24 PROCEDURE — 77066 DX MAMMO INCL CAD BI: CPT | Mod: 26

## 2023-07-24 PROCEDURE — G0279: CPT | Mod: 26

## 2023-07-24 PROCEDURE — 76641 ULTRASOUND BREAST COMPLETE: CPT | Mod: 26,50

## 2023-07-24 PROCEDURE — G0279: CPT

## 2023-07-24 PROCEDURE — 76641 ULTRASOUND BREAST COMPLETE: CPT | Mod: 50

## 2023-07-24 PROCEDURE — 77066 DX MAMMO INCL CAD BI: CPT

## 2023-07-25 ENCOUNTER — RX RENEWAL (OUTPATIENT)
Age: 58
End: 2023-07-25

## 2023-07-25 DIAGNOSIS — R92.8 OTHER ABNORMAL AND INCONCLUSIVE FINDINGS ON DIAGNOSTIC IMAGING OF BREAST: ICD-10-CM

## 2023-07-25 RX ORDER — MELOXICAM 7.5 MG/1
7.5 TABLET ORAL DAILY
Qty: 30 | Refills: 0 | Status: ACTIVE | COMMUNITY
Start: 2023-04-25 | End: 1900-01-01

## 2023-08-07 DIAGNOSIS — E03.9 HYPOTHYROIDISM, UNSPECIFIED: ICD-10-CM

## 2023-08-08 DIAGNOSIS — E03.9 HYPOTHYROIDISM, UNSPECIFIED: ICD-10-CM

## 2023-10-02 ENCOUNTER — OUTPATIENT (OUTPATIENT)
Dept: OUTPATIENT SERVICES | Facility: HOSPITAL | Age: 58
LOS: 1 days | End: 2023-10-02
Payer: COMMERCIAL

## 2023-10-02 ENCOUNTER — APPOINTMENT (OUTPATIENT)
Dept: INTERNAL MEDICINE | Facility: CLINIC | Age: 58
End: 2023-10-02

## 2023-10-02 VITALS
DIASTOLIC BLOOD PRESSURE: 106 MMHG | TEMPERATURE: 99.2 F | HEIGHT: 67 IN | HEART RATE: 70 BPM | OXYGEN SATURATION: 100 % | SYSTOLIC BLOOD PRESSURE: 174 MMHG

## 2023-10-02 VITALS — DIASTOLIC BLOOD PRESSURE: 81 MMHG | SYSTOLIC BLOOD PRESSURE: 156 MMHG

## 2023-10-02 DIAGNOSIS — Z90.711 ACQUIRED ABSENCE OF UTERUS WITH REMAINING CERVICAL STUMP: Chronic | ICD-10-CM

## 2023-10-02 DIAGNOSIS — E89.0 POSTPROCEDURAL HYPOTHYROIDISM: Chronic | ICD-10-CM

## 2023-10-02 DIAGNOSIS — Z00.00 ENCOUNTER FOR GENERAL ADULT MEDICAL EXAMINATION WITHOUT ABNORMAL FINDINGS: ICD-10-CM

## 2023-10-02 PROCEDURE — 99214 OFFICE O/P EST MOD 30 MIN: CPT

## 2023-10-04 ENCOUNTER — APPOINTMENT (OUTPATIENT)
Dept: OBGYN | Facility: CLINIC | Age: 58
End: 2023-10-04

## 2023-10-05 DIAGNOSIS — R73.03 PREDIABETES: ICD-10-CM

## 2023-10-05 DIAGNOSIS — I10 ESSENTIAL (PRIMARY) HYPERTENSION: ICD-10-CM

## 2023-10-05 DIAGNOSIS — E03.9 HYPOTHYROIDISM, UNSPECIFIED: ICD-10-CM

## 2023-10-06 LAB
25(OH)D3 SERPL-MCNC: 34 NG/ML
ALBUMIN SERPL ELPH-MCNC: 4.3 G/DL
ALP BLD-CCNC: 49 U/L
ALT SERPL-CCNC: 15 U/L
ANION GAP SERPL CALC-SCNC: 12 MMOL/L
AST SERPL-CCNC: 20 U/L
BILIRUB SERPL-MCNC: 0.4 MG/DL
BUN SERPL-MCNC: 11 MG/DL
CALCIUM SERPL-MCNC: 9.1 MG/DL
CHLORIDE SERPL-SCNC: 105 MMOL/L
CHOLEST SERPL-MCNC: 170 MG/DL
CO2 SERPL-SCNC: 24 MMOL/L
CREAT SERPL-MCNC: 1 MG/DL
EGFR: 65 ML/MIN/1.73M2
ESTIMATED AVERAGE GLUCOSE: 111 MG/DL
GLUCOSE SERPL-MCNC: 90 MG/DL
HBA1C MFR BLD HPLC: 5.5 %
HDLC SERPL-MCNC: 76 MG/DL
LDLC SERPL CALC-MCNC: 85 MG/DL
NONHDLC SERPL-MCNC: 94 MG/DL
POTASSIUM SERPL-SCNC: 4.4 MMOL/L
PROT SERPL-MCNC: 6.8 G/DL
SODIUM SERPL-SCNC: 141 MMOL/L
TRIGL SERPL-MCNC: 46 MG/DL
TSH SERPL-ACNC: 2.41 UIU/ML

## 2024-01-22 ENCOUNTER — APPOINTMENT (OUTPATIENT)
Dept: INTERNAL MEDICINE | Facility: CLINIC | Age: 59
End: 2024-01-22

## 2024-06-17 ENCOUNTER — APPOINTMENT (OUTPATIENT)
Dept: INTERNAL MEDICINE | Facility: CLINIC | Age: 59
End: 2024-06-17

## 2024-06-17 ENCOUNTER — OUTPATIENT (OUTPATIENT)
Dept: OUTPATIENT SERVICES | Facility: HOSPITAL | Age: 59
LOS: 1 days | End: 2024-06-17
Payer: COMMERCIAL

## 2024-06-17 VITALS
DIASTOLIC BLOOD PRESSURE: 81 MMHG | SYSTOLIC BLOOD PRESSURE: 136 MMHG | BODY MASS INDEX: 27.31 KG/M2 | WEIGHT: 174 LBS | TEMPERATURE: 96.4 F | HEART RATE: 60 BPM | OXYGEN SATURATION: 100 % | HEIGHT: 67 IN

## 2024-06-17 DIAGNOSIS — M25.511 PAIN IN RIGHT SHOULDER: ICD-10-CM

## 2024-06-17 DIAGNOSIS — E03.9 HYPOTHYROIDISM, UNSPECIFIED: ICD-10-CM

## 2024-06-17 DIAGNOSIS — E89.0 POSTPROCEDURAL HYPOTHYROIDISM: Chronic | ICD-10-CM

## 2024-06-17 DIAGNOSIS — R73.03 PREDIABETES.: ICD-10-CM

## 2024-06-17 DIAGNOSIS — E55.9 VITAMIN D DEFICIENCY, UNSPECIFIED: ICD-10-CM

## 2024-06-17 DIAGNOSIS — J45.909 UNSPECIFIED ASTHMA, UNCOMPLICATED: ICD-10-CM

## 2024-06-17 DIAGNOSIS — Z90.711 ACQUIRED ABSENCE OF UTERUS WITH REMAINING CERVICAL STUMP: Chronic | ICD-10-CM

## 2024-06-17 DIAGNOSIS — Z00.00 ENCOUNTER FOR GENERAL ADULT MEDICAL EXAMINATION WITHOUT ABNORMAL FINDINGS: ICD-10-CM

## 2024-06-17 DIAGNOSIS — Z00.00 ENCOUNTER FOR GENERAL ADULT MEDICAL EXAMINATION W/OUT ABNORMAL FINDINGS: ICD-10-CM

## 2024-06-17 PROCEDURE — 73030 X-RAY EXAM OF SHOULDER: CPT | Mod: 26,RT

## 2024-06-17 PROCEDURE — 73030 X-RAY EXAM OF SHOULDER: CPT | Mod: RT

## 2024-06-17 PROCEDURE — 99214 OFFICE O/P EST MOD 30 MIN: CPT

## 2024-06-17 RX ORDER — LEVOTHYROXINE SODIUM 0.11 MG/1
112 TABLET ORAL
Qty: 30 | Refills: 2 | Status: ACTIVE | COMMUNITY
Start: 2020-07-23 | End: 1900-01-01

## 2024-06-17 NOTE — ASSESSMENT
[FreeTextEntry1] : #Right Shoulder Pain - Pain described as intermittent that worsens with overhead activities - Right shoulder x-ray - Conservative measures for now including Tylenol  #Hypertension? - /81 - will monitor for now - Pt instructed to measure BP at home and record it in diary - Pt counseled on diet and exercise   # Pre- DM - A1c 5.5 in July 2023 - repeat A1c  # Left breast cyst with some pain - mammogram July 2023: BI-RADS category 3: Probably Benign - f/u repeat mammogram now  # Hypothyroidism - cont Synthroid 112 mcg - TSH 2.41 - repeat TSH next visit  # Asthma - diagnosed in childhood; states does not use inhalers - currently she has no symptoms, doesn't smoke - chest xray - wnl - monitor off medications  # colon polyps - colonoscopy 2021 -> Diverticulosis of the whole colon. Polyp (5 mm) in the ascending colon. Polyp (7 mm) in the ascending colon. Polyp (7 mm) in the sigmoid colon. - path -> most likely infectious/self limited colitis - Colonoscopy in 2025  #HCM - Mammogram now - schedule Pap smear - colonoscopy in 2025 - follow up in 6 months.

## 2024-06-17 NOTE — HISTORY OF PRESENT ILLNESS
[FreeTextEntry1] : follow up [de-identified] : Pt is a 59 yo female with a PMHx of hypothyroidism s/p thyroidectomy, asthma, uterine fibroids s/p hysterectomy, pre-diabetes who presented today to the clinic for follow up. Today the pt is endorsing sharp right shoulder pain for 3 months duration which is worse with over head motions. She denies any other complaints.

## 2024-06-18 DIAGNOSIS — M25.511 PAIN IN RIGHT SHOULDER: ICD-10-CM

## 2024-06-25 DIAGNOSIS — R73.03 PREDIABETES: ICD-10-CM

## 2024-06-25 DIAGNOSIS — Z00.00 ENCOUNTER FOR GENERAL ADULT MEDICAL EXAMINATION WITHOUT ABNORMAL FINDINGS: ICD-10-CM

## 2024-06-25 DIAGNOSIS — E03.9 HYPOTHYROIDISM, UNSPECIFIED: ICD-10-CM

## 2024-06-25 DIAGNOSIS — M25.511 PAIN IN RIGHT SHOULDER: ICD-10-CM

## 2024-06-25 DIAGNOSIS — E55.9 VITAMIN D DEFICIENCY, UNSPECIFIED: ICD-10-CM

## 2024-06-25 DIAGNOSIS — J45.909 UNSPECIFIED ASTHMA, UNCOMPLICATED: ICD-10-CM

## 2024-07-11 ENCOUNTER — RESULT REVIEW (OUTPATIENT)
Age: 59
End: 2024-07-11

## 2024-07-11 ENCOUNTER — OUTPATIENT (OUTPATIENT)
Dept: OUTPATIENT SERVICES | Facility: HOSPITAL | Age: 59
LOS: 1 days | End: 2024-07-11
Payer: COMMERCIAL

## 2024-07-11 DIAGNOSIS — R92.8 OTHER ABNORMAL AND INCONCLUSIVE FINDINGS ON DIAGNOSTIC IMAGING OF BREAST: ICD-10-CM

## 2024-07-11 DIAGNOSIS — Z90.711 ACQUIRED ABSENCE OF UTERUS WITH REMAINING CERVICAL STUMP: Chronic | ICD-10-CM

## 2024-07-11 DIAGNOSIS — E89.0 POSTPROCEDURAL HYPOTHYROIDISM: Chronic | ICD-10-CM

## 2024-07-11 PROCEDURE — G0279: CPT

## 2024-07-11 PROCEDURE — G0279: CPT | Mod: 26

## 2024-07-11 PROCEDURE — 76641 ULTRASOUND BREAST COMPLETE: CPT | Mod: 50

## 2024-07-11 PROCEDURE — 77066 DX MAMMO INCL CAD BI: CPT

## 2024-07-11 PROCEDURE — 76641 ULTRASOUND BREAST COMPLETE: CPT | Mod: 26,50

## 2024-07-11 PROCEDURE — 77066 DX MAMMO INCL CAD BI: CPT | Mod: 26

## 2024-07-12 DIAGNOSIS — R92.8 OTHER ABNORMAL AND INCONCLUSIVE FINDINGS ON DIAGNOSTIC IMAGING OF BREAST: ICD-10-CM

## 2024-07-22 DIAGNOSIS — R92.8 OTHER ABNORMAL AND INCONCLUSIVE FINDINGS ON DIAGNOSTIC IMAGING OF BREAST: ICD-10-CM

## 2024-07-25 ENCOUNTER — RESULT REVIEW (OUTPATIENT)
Age: 59
End: 2024-07-25

## 2024-07-25 ENCOUNTER — APPOINTMENT (OUTPATIENT)
Age: 59
End: 2024-07-25
Payer: COMMERCIAL

## 2024-07-25 PROCEDURE — 88305 TISSUE EXAM BY PATHOLOGIST: CPT | Mod: 26

## 2024-07-25 PROCEDURE — 19081 BX BREAST 1ST LESION STRTCTC: CPT | Mod: RT

## 2024-07-25 PROCEDURE — 76098 X-RAY EXAM SURGICAL SPECIMEN: CPT | Mod: 26

## 2024-07-25 PROCEDURE — 88360 TUMOR IMMUNOHISTOCHEM/MANUAL: CPT | Mod: 26

## 2024-07-30 ENCOUNTER — NON-APPOINTMENT (OUTPATIENT)
Age: 59
End: 2024-07-30

## 2024-08-02 DIAGNOSIS — C50.919 MALIGNANT NEOPLASM OF UNSPECIFIED SITE OF UNSPECIFIED FEMALE BREAST: ICD-10-CM

## 2024-08-05 ENCOUNTER — APPOINTMENT (OUTPATIENT)
Age: 59
End: 2024-08-05

## 2024-08-05 ENCOUNTER — APPOINTMENT (OUTPATIENT)
Dept: INTERNAL MEDICINE | Facility: CLINIC | Age: 59
End: 2024-08-05

## 2024-08-05 ENCOUNTER — OUTPATIENT (OUTPATIENT)
Dept: OUTPATIENT SERVICES | Facility: HOSPITAL | Age: 59
LOS: 1 days | End: 2024-08-05
Payer: COMMERCIAL

## 2024-08-05 ENCOUNTER — APPOINTMENT (OUTPATIENT)
Dept: BREAST CENTER | Facility: CLINIC | Age: 59
End: 2024-08-05

## 2024-08-05 DIAGNOSIS — E89.0 POSTPROCEDURAL HYPOTHYROIDISM: Chronic | ICD-10-CM

## 2024-08-05 DIAGNOSIS — Z90.711 ACQUIRED ABSENCE OF UTERUS WITH REMAINING CERVICAL STUMP: Chronic | ICD-10-CM

## 2024-08-05 DIAGNOSIS — C50.919 MALIGNANT NEOPLASM OF UNSPECIFIED SITE OF UNSPECIFIED FEMALE BREAST: ICD-10-CM

## 2024-08-05 PROBLEM — D05.11 DUCTAL CARCINOMA IN SITU (DCIS) OF RIGHT BREAST: Status: ACTIVE | Noted: 2024-08-05

## 2024-08-05 PROBLEM — Z80.3 FAMILY HISTORY OF BREAST CANCER: Status: ACTIVE | Noted: 2024-08-05

## 2024-08-05 PROBLEM — R92.8 ABNORMAL FINDING ON BREAST IMAGING: Status: ACTIVE | Noted: 2024-08-05

## 2024-08-05 PROBLEM — R92.30 DENSE BREAST TISSUE: Status: ACTIVE | Noted: 2024-08-05

## 2024-08-05 PROBLEM — N63.20 MASS OF LEFT BREAST, UNSPECIFIED QUADRANT: Status: ACTIVE | Noted: 2024-08-05

## 2024-08-05 PROCEDURE — 99204 OFFICE O/P NEW MOD 45 MIN: CPT

## 2024-08-05 PROCEDURE — 99205 OFFICE O/P NEW HI 60 MIN: CPT

## 2024-08-05 NOTE — HISTORY OF PRESENT ILLNESS
[FreeTextEntry1] : Stefani is 59 year old female here with new dx of right breast DCIS, ER/[place] (+) and LEFT breast BIRADS3 abnormality   She has no breast related complaints at this time.  She denies any breast pain, has not palpated any new palpable masses in either breast and denies any nipple discharge or retraction.  Her imaging is as follows: 2024 b/l dx mammo and UA -->birads4 breasts are heterogeneously dense  grouped heterogeneous calcifications in superior aspect of the right breast--> Stereotactic biopsy recommended.  left US: - 2:00 N7 of the left breast there is a stable hypoechoic mass measuring 0.5 x 0.4 x 0.3 cm-->Short interval follow-up recommended. At 2:00 N 5-6 there is a stable hypoechoic mass measuring 0.5 x 0.4 x 0.2 cm. Short interval follow-up recommended. At 2:00 N5 there is a stable hypoechoic mass measuring 0.8 x 0.7 x 0.3 cm. Short interval follow-up recommended.   2024  Breast, Right Calcifications, Stereotactic Guided Vacuum-Assisted Needle Core Biopsies: (mini-cork)  - Ductal Carcinoma In Situ (Dcis), Solid And Cribriform Types With Comedonecrosis And Calcifications, High Nuclear Grade.  Findings are malignant and concordant. ESTROGEN RECEPTOR                99 STRONG (3+) PROGESTERONE RECEPTOR     80 MODERATE/STRONG (2+/3+)   HISTORICAL RISK FACTORS: -fam hx of sister with breast cancer at age 60. maternal gm with breast cancer in 70s -no fam hx of ovarian cancer  - first born at age 17yo  -partial hysterectomy -no HRT

## 2024-08-05 NOTE — PHYSICAL EXAM
[___cm] : ~M [unfilled] ~Ucm upper outer quadrant mass [Breast Nipple Inversion] : nipples not inverted [Breast Nipple Retraction] : nipples not retracted [Breast Nipple Flattening] : nipples not flattened [Breast Nipple Fissures] : nipples not fissured [Breast Abnormal Lactation (Galactorrhea)] : no galactorrhea [Breast Abnormal Secretion Bloody Fluid] : no bloody discharge [Breast Abnormal Secretion Opalescent Fluid] : no milky discharge [Breast Abnormal Secretion Serous Fluid] : no serous discharge [No Axillary Lymphadenopathy] : no left axillary lymphadenopathy

## 2024-08-05 NOTE — HISTORY OF PRESENT ILLNESS
[FreeTextEntry1] : Stefani is 59 year old female here with new dx of right breast DCIS, ER/[place] (+) and LEFT breast BIRADS3 abnormality   She has no breast related complaints at this time.  She denies any breast pain, has not palpated any new palpable masses in either breast and denies any nipple discharge or retraction.  Her imaging is as follows: 2024 b/l dx mammo and UA -->birads4 breasts are heterogeneously dense  grouped heterogeneous calcifications in superior aspect of the right breast--> Stereotactic biopsy recommended.  left US: - 2:00 N7 of the left breast there is a stable hypoechoic mass measuring 0.5 x 0.4 x 0.3 cm-->Short interval follow-up recommended. At 2:00 N 5-6 there is a stable hypoechoic mass measuring 0.5 x 0.4 x 0.2 cm. Short interval follow-up recommended. At 2:00 N5 there is a stable hypoechoic mass measuring 0.8 x 0.7 x 0.3 cm. Short interval follow-up recommended.   2024  Breast, Right Calcifications, Stereotactic Guided Vacuum-Assisted Needle Core Biopsies: (mini-cork)  - Ductal Carcinoma In Situ (Dcis), Solid And Cribriform Types With Comedonecrosis And Calcifications, High Nuclear Grade.  Findings are malignant and concordant. ESTROGEN RECEPTOR                99 STRONG (3+) PROGESTERONE RECEPTOR     80 MODERATE/STRONG (2+/3+)   HISTORICAL RISK FACTORS: -fam hx of sister with breast cancer at age 60. maternal gm with breast cancer in 70s -no fam hx of ovarian cancer  - first born at age 19yo  -partial hysterectomy -no HRT

## 2024-08-05 NOTE — PAST MEDICAL HISTORY
[Menarche Age ____] : age at menarche was [unfilled] [Menopause Age____] : age at menopause was [unfilled] [Total Preg ___] : G[unfilled] [Age At Live Birth ___] : Age at live birth: [unfilled] [FreeTextEntry5] : PARTIAL hysterectomy at 44 y/o  [FreeTextEntry7] : <5years  [FreeTextEntry8] : none

## 2024-08-05 NOTE — DATA REVIEWED
[FreeTextEntry1] : [id number]     EXAM:   US BREAST COMPLETE BI   ORDERED BY: DAVID CISNEROS  ACC: [id number]     EXAM:   MAMMO DIAG W GABY BI#   ORDERED BY: DAVID CISNEROS  PROCEDURE DATE:  07/11/2024    INTERPRETATION:  Clinical History / Reason for exam: Follow-up left breast mass since July 2023.  The patient reports last clinical breast examination was performed about: Over a year ago.  Family history of breast cancer: Sister at age 57.  Comparisons: Mammograms dating back to 2022.  Views obtained: bilateral full field digital 2D and digital tomosynthesis images as well as magnification views.  Computer-aided detection was utilized in the interpretation of this examination.  Breast composition: The breasts are heterogeneously dense, which may obscure small masses.  Findings:  Mammogram: There are grouped heterogeneous calcifications in superior aspect of the right breast. Stereotactic biopsy recommended. No suspicious mass, microcalcifications or areas of architectural distortion seen in the left breast.  Ultrasound:  Bilateral whole breast ultrasound was performed. At 2:00 N7 of the left breast there is a stable hypoechoic mass measuring 0.5 x 0.4 x 0.3 cm. Short interval follow-up recommended. At 2:00 N 5-6 there is a stable hypoechoic mass measuring 0.5 x 0.4 x 0.2 cm. Short interval follow-up recommended. At 2:00 N5 there is a stable hypoechoic mass measuring 0.8 x 0.7 x 0.3 cm. Short interval follow-up recommended. There is no other solid or cystic mass noted in either breast. No right or left axillary lymphadenopathy.  Impression: 1. Superior right breast grouped heterogeneous calcifications. Stereotactic biopsy recommended. 2. Stable left breast masses as above. Short interval follow-up with ultrasound recommended.  Recommendation: Stereotactic guided biopsy.  BI-RADS Category 4: Suspicious CC: [id number]     EXAM:   STEREO BX 1ST RT SISC   ORDERED BY: DAVID CISNEROS  *** ADDENDUM # 1 ***  PATHOLOGY FINAL DIAGNOSIS:  Breast, Right Calcifications, Stereotactic Guided Vacuum-Assisted Needle Core Biopsies:  - Ductal Carcinoma In Situ (Dcis), Solid And Cribriform Types With Comedonecrosis And Calcifications, High Nuclear Grade.   Findings are malignant and concordant.  RECOMMENDATION: Surgical/oncologic consultation and management.  Findings and recommendation were communicated to the patient on 7/29/2024.   --- End of Report ---  *** END OF ADDENDUM # 1 ***   PROCEDURE DATE:  07/25/2024    INTERPRETATION:  Clinical History / Reason for exam: Right breast calcifications.  Images and reports were reviewed. Consent was obtained following a discussion of risks and benefits of the procedure as well as questioning about patient allergies. The patient safety checklist, including time out procedure, was used.  The right breast was compressed and stereo and tomographic images were obtained to locate the calcifications. The calcifications were located in the superior right breast. A superior approach was used. The area was prepped and draped in the normal sterile fashion.  The skin was anesthetized with 5 mL buffered 1% lidocaine. A small skin incision was made.  Through the incision, using a biopsy gun, a 9 gauge needle was introduced and pre-and post-fire stereo images were obtained. Deep anesthesia was given with 10 mL 1% lidocaine with epinephrine. Numerous specimens were taken. The specimens were x-rayed and several of them contain microcalcifications.  Through the needle a radiopaque marker ([place] mini-cork) was deposited.  The needle was removed and the breast was compressed to achieve hemostasis. The patient tolerated the procedure well and there was no immediate post procedure complication.  The specimens were  into those that contain calcifications and those that do not contain calcifications and were put in different containers and sent to pathology.  Post procedure two view mammogram demonstrates the marking clip to be in good position.  IMPRESSION:  Successful stereotactic core biopsy of microcalcifications in the right breast.  Histology: Pending, to be reported in an addendum. ected Date/Time:                   7/25/2024 14:00 EDT Received Date/Time:                    7/25/2024 15:39 EDT  Addendum Report - Auth (Verified)  Addendum Immunohistochemical studies were performed at [facility], [address] [place] [address], [facility] (see NOTE).  The results are as follows:  % POSITIVE  STAINING INTENSITY  ESTROGEN RECEPTOR                99 STRONG (3+) PROGESTERONE RECEPTOR     80 MODERATE/STRONG (2+/3+)  A positive test result is defined as positive nuclear staining in >1% of tumor cells.                              A negative test result is defined as nuclear staining in <1% of tumor cells.  Type of specimen fixation: 10% buffered formalin  Time to fixation: <1 HR Time in formalin: >6 HRS, <72 HRS  Detection system used: Ultra View Universal DAB detection kit. Antibody clone used: Anti-ER Rabbit Monoclonal, [place], clone [id number].

## 2024-08-05 NOTE — DATA REVIEWED
[FreeTextEntry1] : [id number]     EXAM:   US BREAST COMPLETE BI   ORDERED BY: DAVID CISNEROS  ACC: [id number]     EXAM:   MAMMO DIAG W GABY BI#   ORDERED BY: DAVID CISNEROS  PROCEDURE DATE:  07/11/2024    INTERPRETATION:  Clinical History / Reason for exam: Follow-up left breast mass since July 2023.  The patient reports last clinical breast examination was performed about: Over a year ago.  Family history of breast cancer: Sister at age 57.  Comparisons: Mammograms dating back to 2022.  Views obtained: bilateral full field digital 2D and digital tomosynthesis images as well as magnification views.  Computer-aided detection was utilized in the interpretation of this examination.  Breast composition: The breasts are heterogeneously dense, which may obscure small masses.  Findings:  Mammogram: There are grouped heterogeneous calcifications in superior aspect of the right breast. Stereotactic biopsy recommended. No suspicious mass, microcalcifications or areas of architectural distortion seen in the left breast.  Ultrasound:  Bilateral whole breast ultrasound was performed. At 2:00 N7 of the left breast there is a stable hypoechoic mass measuring 0.5 x 0.4 x 0.3 cm. Short interval follow-up recommended. At 2:00 N 5-6 there is a stable hypoechoic mass measuring 0.5 x 0.4 x 0.2 cm. Short interval follow-up recommended. At 2:00 N5 there is a stable hypoechoic mass measuring 0.8 x 0.7 x 0.3 cm. Short interval follow-up recommended. There is no other solid or cystic mass noted in either breast. No right or left axillary lymphadenopathy.  Impression: 1. Superior right breast grouped heterogeneous calcifications. Stereotactic biopsy recommended. 2. Stable left breast masses as above. Short interval follow-up with ultrasound recommended.  Recommendation: Stereotactic guided biopsy.  BI-RADS Category 4: Suspicious CC: [id number]     EXAM:   STEREO BX 1ST RT SISC   ORDERED BY: DAVID CISNEROS  *** ADDENDUM # 1 ***  PATHOLOGY FINAL DIAGNOSIS:  Breast, Right Calcifications, Stereotactic Guided Vacuum-Assisted Needle Core Biopsies:  - Ductal Carcinoma In Situ (Dcis), Solid And Cribriform Types With Comedonecrosis And Calcifications, High Nuclear Grade.   Findings are malignant and concordant.  RECOMMENDATION: Surgical/oncologic consultation and management.  Findings and recommendation were communicated to the patient on 7/29/2024.   --- End of Report ---  *** END OF ADDENDUM # 1 ***   PROCEDURE DATE:  07/25/2024    INTERPRETATION:  Clinical History / Reason for exam: Right breast calcifications.  Images and reports were reviewed. Consent was obtained following a discussion of risks and benefits of the procedure as well as questioning about patient allergies. The patient safety checklist, including time out procedure, was used.  The right breast was compressed and stereo and tomographic images were obtained to locate the calcifications. The calcifications were located in the superior right breast. A superior approach was used. The area was prepped and draped in the normal sterile fashion.  The skin was anesthetized with 5 mL buffered 1% lidocaine. A small skin incision was made.  Through the incision, using a biopsy gun, a 9 gauge needle was introduced and pre-and post-fire stereo images were obtained. Deep anesthesia was given with 10 mL 1% lidocaine with epinephrine. Numerous specimens were taken. The specimens were x-rayed and several of them contain microcalcifications.  Through the needle a radiopaque marker ([employer] mini-cork) was deposited.  The needle was removed and the breast was compressed to achieve hemostasis. The patient tolerated the procedure well and there was no immediate post procedure complication.  The specimens were  into those that contain calcifications and those that do not contain calcifications and were put in different containers and sent to pathology.  Post procedure two view mammogram demonstrates the marking clip to be in good position.  IMPRESSION:  Successful stereotactic core biopsy of microcalcifications in the right breast.  Histology: Pending, to be reported in an addendum. ected Date/Time:                   7/25/2024 14:00 EDT Received Date/Time:                    7/25/2024 15:39 EDT  Addendum Report - Auth (Verified)  Addendum Immunohistochemical studies were performed at [facility], [address] [place] [address], [facility] (see NOTE).  The results are as follows:  % POSITIVE  STAINING INTENSITY  ESTROGEN RECEPTOR                99 STRONG (3+) PROGESTERONE RECEPTOR     80 MODERATE/STRONG (2+/3+)  A positive test result is defined as positive nuclear staining in >1% of tumor cells.                              A negative test result is defined as nuclear staining in <1% of tumor cells.  Type of specimen fixation: 10% buffered formalin  Time to fixation: <1 HR Time in formalin: >6 HRS, <72 HRS  Detection system used: Ultra View Universal DAB detection kit. Antibody clone used: Anti-ER Rabbit Monoclonal, [place], clone [id number].

## 2024-08-05 NOTE — ASSESSMENT
[FreeTextEntry1] : Stefani is 59 year old female here with new dx of right breast DCIS, ER/[place] (+) and LEFT breast BIRADS3 abnormality   Her imaging is as follows: 7/11/2024 b/l dx mammo and UA -->birads4 breasts are heterogeneously dense  grouped heterogeneous calcifications in superior aspect of the right breast--> Stereotactic biopsy recommended.  left US: - 2:00 N7 of the left breast there is a stable hypoechoic mass measuring 0.5 x 0.4 x 0.3 cm-->Short interval follow-up recommended. At 2:00 N 5-6 there is a stable hypoechoic mass measuring 0.5 x 0.4 x 0.2 cm. Short interval follow-up recommended. At 2:00 N5 there is a stable hypoechoic mass measuring 0.8 x 0.7 x 0.3 cm. Short interval follow-up recommended.  07/25/2024  Breast, Right Calcifications, Stereotactic Guided Vacuum-Assisted Needle Core Biopsies: (mini-cork)  - Ductal Carcinoma In Situ (Dcis), Solid And Cribriform Types With Comedonecrosis And Calcifications, High Nuclear Grade.  Findings are malignant and concordant. ESTROGEN RECEPTOR                99 STRONG (3+) PROGESTERONE RECEPTOR     80 MODERATE/STRONG (2+/3+)   On physical exam, there is a small post biopsy masses in right breast. There is no nipple discharge or inversion bilaterally.  There are no skin changes bilaterally.  We had a lengthy discussion regarding her diagnosis and treatment options.  Her pathology results were reviewed.   her surgical options include breast conserving therapy (lumpectomy) to negative margins, or mastectomy, with or without reconstruction.    We discussed that there is no difference in survival rate between lumpectomy with radiation therapy versus a mastectomy.  However, the rate of local recurrence is slightly higher with lumpectomy.  The rate of recurrence with mastectomy is not zero, and is likely closer to 4-9%.  At this time, she is leaning towards undergoing breast conservation therapy with lumpectomy.  Since this is not readily palpable, it will need to be localized preoperatively with a tag placement prior the day of her surgery.     The benefits and risks of the lumpectomy procedure were explained to the patient, including but not limited to bleeding, infection, seroma and/or hematoma formation, pain, numbness of skin, scarring, and possible re-operation if the surgical excision demonstrates positive margins.  Informed consent was obtained today.  She is aware that she will meet with the pre-surgical department here at the hospital for pre-surgery testing.  She is also aware that she will likely need to meet with her primary care physician, and/or other medical specialists to obtain clearance for surgery, and to contact them appropriately.      With regard to systemic therapy, chemotherapy would not be indicated for the treatment of DCIS.  Hormonal therapy with an aromatase inhibitor or Tamoxifen, may be advised as the disease is estrogen receptor positive.  Not only can it help prevent breast cancer recurrence, it can help reduce the risk of developing a new primary tumor.  This medication is usually taken for five years.  She will be referred to medical oncology post-operatively for discussion.    We offered genetic testing and denomic evaluations. she agreed. L birad 3 lesion will be biospied before surgery. Total time on encounter: 62 mins

## 2024-08-05 NOTE — ASSESSMENT
[FreeTextEntry1] : Stefani is 59 year old female here with new dx of right breast DCIS, ER/[place] (+) and LEFT breast BIRADS3 abnormality   Her imaging is as follows: 7/11/2024 b/l dx mammo and UA -->birads4 breasts are heterogeneously dense  grouped heterogeneous calcifications in superior aspect of the right breast--> Stereotactic biopsy recommended.  left US: - 2:00 N7 of the left breast there is a stable hypoechoic mass measuring 0.5 x 0.4 x 0.3 cm-->Short interval follow-up recommended. At 2:00 N 5-6 there is a stable hypoechoic mass measuring 0.5 x 0.4 x 0.2 cm. Short interval follow-up recommended. At 2:00 N5 there is a stable hypoechoic mass measuring 0.8 x 0.7 x 0.3 cm. Short interval follow-up recommended.  07/25/2024  Breast, Right Calcifications, Stereotactic Guided Vacuum-Assisted Needle Core Biopsies: (mini-cork)  - Ductal Carcinoma In Situ (Dcis), Solid And Cribriform Types With Comedonecrosis And Calcifications, High Nuclear Grade.  Findings are malignant and concordant. ESTROGEN RECEPTOR                99 STRONG (3+) PROGESTERONE RECEPTOR     80 MODERATE/STRONG (2+/3+)   On physical exam, there is a small post biopsy masses in right breast. There is no nipple discharge or inversion bilaterally.  There are no skin changes bilaterally.  We had a lengthy discussion regarding her diagnosis and treatment options.  Her pathology results were reviewed.   her surgical options include breast conserving therapy (lumpectomy) to negative margins, or mastectomy, with or without reconstruction.    We discussed that there is no difference in survival rate between lumpectomy with radiation therapy versus a mastectomy.  However, the rate of local recurrence is slightly higher with lumpectomy.  The rate of recurrence with mastectomy is not zero, and is likely closer to 4-9%.  At this time, she is leaning towards undergoing breast conservation therapy with lumpectomy.  Since this is not readily palpable, it will need to be localized preoperatively with a tag placement prior the day of her surgery.     The benefits and risks of the lumpectomy procedure were explained to the patient, including but not limited to bleeding, infection, seroma and/or hematoma formation, pain, numbness of skin, scarring, and possible re-operation if the surgical excision demonstrates positive margins.  Informed consent was obtained today.  She is aware that she will meet with the pre-surgical department here at the hospital for pre-surgery testing.  She is also aware that she will likely need to meet with her primary care physician, and/or other medical specialists to obtain clearance for surgery, and to contact them appropriately.      With regard to systemic therapy, chemotherapy would not be indicated for the treatment of DCIS.  Hormonal therapy with an aromatase inhibitor or Tamoxifen, may be advised as the disease is estrogen receptor positive.  Not only can it help prevent breast cancer recurrence, it can help reduce the risk of developing a new primary tumor.  This medication is usually taken for five years.  She will be referred to medical oncology post-operatively for discussion.    We offered genetic testing and denomic evaluations. she agreed. L birad 3 lesion will be biospied before surgery. Total time on encounter: 62 mins

## 2024-08-05 NOTE — PAST MEDICAL HISTORY
[Menarche Age ____] : age at menarche was [unfilled] [Menopause Age____] : age at menopause was [unfilled] [Total Preg ___] : G[unfilled] [Age At Live Birth ___] : Age at live birth: [unfilled] [FreeTextEntry5] : PARTIAL hysterectomy at 46 y/o  [FreeTextEntry7] : <5years  [FreeTextEntry8] : none

## 2024-08-06 ENCOUNTER — NON-APPOINTMENT (OUTPATIENT)
Age: 59
End: 2024-08-06

## 2024-08-06 ENCOUNTER — APPOINTMENT (OUTPATIENT)
Dept: HEMATOLOGY ONCOLOGY | Facility: CLINIC | Age: 59
End: 2024-08-06

## 2024-08-06 DIAGNOSIS — C50.919 MALIGNANT NEOPLASM OF UNSPECIFIED SITE OF UNSPECIFIED FEMALE BREAST: ICD-10-CM

## 2024-08-07 NOTE — DISCUSSION/SUMMARY
[FreeTextEntry1] : REASON FOR VISIT: Ms. Stefani Aparicio is a 59-year-old female who was referred by Dr. Carlos Bowling for cancer genetic counseling and risk assessment due to a personal and family history of breast cancer. The patient was seen via telephonic visit on 2024 through [facility]. The patient was unaccompanied.   RELEVANT MEDICAL AND SURGICAL HISTORY:    OTHER MEDICAL AND SURGICAL HISTORY: - History of TLH and unilateral oophorectomy due to fibroids  - Thyroidectomy for nodules   PAST OB/GYN HISTORY: Obstetrical History:  Age at Menarche: Post-Menopausal Age at First Live Birth: Oral Contraceptive Use: Hormone Replacement Therapy:   CANCER SCREENING HISTORY: Breast: Last mammogram _. Frequency: __. GYN: Last visit _. Frequency: _.  Colon: Last colonoscopy 3 years ago, the patient reported several polyps (unknown quantity). Frequency: 5 years. Skin: Denied    SOCIAL HISTORY:  Tobacco-product use: Former smoker, quit 15 years ago   FAMILY HISTORY: Paternal ancestry was reported as -American and maternal ancestry was reported as -American. A detailed family history of cancer was ascertained, see below for pedigree. Of note: - Sister with breast cancer at 61  - Maternal great-aunt with breast cancer - Maternal great-grandmother with breast cancer    The remaining family history is unremarkable. According to the patient there are no other known cases of significant cancers in the family. To her knowledge no one else in the family has had germline testing for cancer susceptibility.   RISK ASSESSMENT: Ms. Aparicio's personal and family history of cancer may be suggestive of a hereditary cancer susceptibility syndrome. Variants in breast cancer susceptibility genes were of specific concern.   We discussed the risks, benefits and limitations, financial cost and implications of genetic testing. We also discussed the psychosocial implications of genetic testing. Possible test results were reviewed with the patient, along with associated medical management options. The Genetic Information Non-discrimination Act (PAN) was also reviewed.   The patient consented to genetic testing for hereditary cancer. A sample was obtained from the patient in our clinic and will be sent to [facility] for analysis.   PLAN: 1. The patient's sample will be sent to [facility] for analysis. 2. We will contact the patient once the results are available. Results generally return in 2-3 weeks.   For any additional questions please call Cancer Genetics at [phone] or [phone].     Veronica Perez MS, [facility] Counselor, Cancer Genetics

## 2024-08-07 NOTE — DISCUSSION/SUMMARY
[FreeTextEntry1] : REASON FOR VISIT: Ms. Stefani Aparicio is a 59-year-old female who was referred by Dr. Carlos Bowling for cancer genetic counseling and risk assessment due to a personal and family history of breast cancer. The patient was seen via telephonic visit on 2024 through [facility]. The patient was unaccompanied.   RELEVANT MEDICAL AND SURGICAL HISTORY:    OTHER MEDICAL AND SURGICAL HISTORY: - History of TLH and unilateral oophorectomy due to fibroids  - Thyroidectomy for nodules   PAST OB/GYN HISTORY: Obstetrical History:  Age at Menarche: Post-Menopausal Age at First Live Birth: Oral Contraceptive Use: Hormone Replacement Therapy:   CANCER SCREENING HISTORY: Breast: Last mammogram _. Frequency: __. GYN: Last visit _. Frequency: _.  Colon: Last colonoscopy 3 years ago, the patient reported several polyps (unknown quantity). Frequency: 5 years. Skin: Denied    SOCIAL HISTORY:  Tobacco-product use: Former smoker, quit 15 years ago   FAMILY HISTORY: Paternal ancestry was reported as -American and maternal ancestry was reported as -American. A detailed family history of cancer was ascertained, see below for pedigree. Of note: - Sister with breast cancer at 61  - Maternal great-aunt with breast cancer - Maternal great-grandmother with breast cancer    The remaining family history is unremarkable. According to the patient there are no other known cases of significant cancers in the family. To her knowledge no one else in the family has had germline testing for cancer susceptibility.   RISK ASSESSMENT: Ms. Aparicio's personal and family history of cancer may be suggestive of a hereditary cancer susceptibility syndrome. Variants in breast cancer susceptibility genes were of specific concern.   We discussed the risks, benefits and limitations, financial cost and implications of genetic testing. We also discussed the psychosocial implications of genetic testing. Possible test results were reviewed with the patient, along with associated medical management options. The Genetic Information Non-discrimination Act (PAN) was also reviewed.   The patient consented to genetic testing for hereditary cancer. A sample was obtained from the patient in our clinic and will be sent to [facility] for analysis.   PLAN: 1. The patient's sample will be sent to [facility] for analysis. 2. We will contact the patient once the results are available. Results generally return in 2-3 weeks.   For any additional questions please call Cancer Genetics at [phone] or [phone].     Veronica Perez MS, [facility] Genetic Counselor, Cancer Genetics

## 2024-08-09 ENCOUNTER — RESULT REVIEW (OUTPATIENT)
Age: 59
End: 2024-08-09

## 2024-08-09 ENCOUNTER — OUTPATIENT (OUTPATIENT)
Dept: OUTPATIENT SERVICES | Facility: HOSPITAL | Age: 59
LOS: 1 days | End: 2024-08-09
Payer: COMMERCIAL

## 2024-08-09 ENCOUNTER — APPOINTMENT (OUTPATIENT)
Age: 59
End: 2024-08-09

## 2024-08-09 DIAGNOSIS — Z90.711 ACQUIRED ABSENCE OF UTERUS WITH REMAINING CERVICAL STUMP: Chronic | ICD-10-CM

## 2024-08-09 DIAGNOSIS — R92.8 OTHER ABNORMAL AND INCONCLUSIVE FINDINGS ON DIAGNOSTIC IMAGING OF BREAST: ICD-10-CM

## 2024-08-09 DIAGNOSIS — N63.20 UNSPECIFIED LUMP IN THE LEFT BREAST, UNSPECIFIED QUADRANT: ICD-10-CM

## 2024-08-09 PROBLEM — Z86.39 HISTORY OF HYPOTHYROIDISM: Status: RESOLVED | Noted: 2024-08-09 | Resolved: 2024-08-09

## 2024-08-09 PROBLEM — Z86.018 HISTORY OF FIBROIDS: Status: RESOLVED | Noted: 2024-08-09 | Resolved: 2024-08-09

## 2024-08-09 PROCEDURE — 19084 BX BREAST ADD LESION US IMAG: CPT | Mod: LT

## 2024-08-09 PROCEDURE — 77065 DX MAMMO INCL CAD UNI: CPT | Mod: 26,LT

## 2024-08-09 PROCEDURE — 88305 TISSUE EXAM BY PATHOLOGIST: CPT

## 2024-08-09 PROCEDURE — 19083 BX BREAST 1ST LESION US IMAG: CPT | Mod: LT

## 2024-08-09 PROCEDURE — 88305 TISSUE EXAM BY PATHOLOGIST: CPT | Mod: 26

## 2024-08-09 PROCEDURE — A4648: CPT

## 2024-08-09 PROCEDURE — 77065 DX MAMMO INCL CAD UNI: CPT | Mod: LT

## 2024-08-12 LAB — SURGICAL PATHOLOGY STUDY: SIGNIFICANT CHANGE UP

## 2024-08-15 ENCOUNTER — NON-APPOINTMENT (OUTPATIENT)
Age: 59
End: 2024-08-15

## 2024-08-15 NOTE — DISCUSSION/SUMMARY
[FreeTextEntry1] : REASON FOR VISIT: Ms. Stefani Aparicio is a 59-year-old female who was referred by Dr. Carlos Bowling for cancer genetic counseling and risk assessment due to a personal and family history of breast cancer. The patient was seen via telephonic visit on 2024 through [facility]. The patient was unaccompanied.   RELEVANT MEDICAL AND SURGICAL HISTORY: Ms. 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 The plan is for surgery and hormonal therapy with AI or Tamoxifen.   OTHER MEDICAL AND SURGICAL HISTORY: - History of TLH and unilateral oophorectomy due to fibroids  - Thyroidectomy for nodules   PAST OB/GYN HISTORY: Obstetrical History:  Post-Menopausal Age at First Live Birth: 18 Hormone Replacement Therapy: Denied    CANCER SCREENING HISTORY: Breast: See comments above. GYN: History of TLH and unilateral oophorectomy due to fibroids. Colon: Last colonoscopy 3 years ago, the patient reported several polyps (unknown quantity). Frequency: 5 years. Skin: Denied    SOCIAL HISTORY:  Tobacco-product use: Former smoker, quit 15 years ago   FAMILY HISTORY: Paternal ancestry was reported as -American and maternal ancestry was reported as -American. A detailed family history of cancer was ascertained, see below for pedigree. Of note: - Sister with breast cancer at 61  - Maternal great-aunt with breast cancer - Maternal great-grandmother with breast cancer    The remaining family history is unremarkable. According to the patient there are no other known cases of significant cancers in the family. To her knowledge no one else in the family has had germline testing for cancer susceptibility.   RISK ASSESSMENT: Ms. Aparicio's personal and family history of cancer may be suggestive of a hereditary cancer susceptibility syndrome. Variants in breast cancer susceptibility genes were of specific concern.   We discussed the risks, benefits and limitations, financial cost and implications of genetic testing. We also discussed the psychosocial implications of genetic testing. Possible test results were reviewed with the patient, along with associated medical management options. The Genetic Information Non-discrimination Act (PAN) was also reviewed.   The patient consented to genetic testing for hereditary cancer. A sample was obtained from the patient in our clinic and will be sent to [facility] for analysis.   PLAN: 1. The patient's sample will be sent to [facility] for analysis. 2. We will contact the patient once the results are available. Results generally return in 2-3 weeks.   For any additional questions please call Cancer Genetics at [phone] or [phone].     Veronica Perez MS, [facility] Genetic Counselor, Cancer Genetics

## 2024-08-15 NOTE — DISCUSSION/SUMMARY
[FreeTextEntry1] : REASON FOR VISIT: Ms. Stefani Aparicio is a 59-year-old female who was called on 8/15/2024 for a discussion regarding her negative genetic test results related to hereditary cancer predisposition.   RELEVANT MEDICAL AND SURGICAL HISTORY: Ms. Aparicio was diagnosed with right breast ductal carcinoma in-situ (DCIS), ER+/IN+ at the age of 59. The plan is for surgery and hormonal therapy with AI or Tamoxifen.  OTHER MEDICAL AND SURGICAL HISTORY: - History of TLH and unilateral oophorectomy due to fibroids - Thyroidectomy for nodules  PAST OB/GYN HISTORY: Obstetrical History:  Post-Menopausal Age at First Live Birth: 18 Hormone Replacement Therapy: Denied  CANCER SCREENING HISTORY: Breast: See comments above. GYN: History of TLH and unilateral oophorectomy due to fibroids. Colon: Last colonoscopy 3 years ago, the patient reported several polyps (unknown quantity). Frequency: 5 years. Skin: Denied  SOCIAL HISTORY:  Tobacco-product use: Former smoker, quit 15 years ago  FAMILY HISTORY: Paternal ancestry was reported as -American and maternal ancestry was reported as -American. A detailed family history of cancer was ascertained, see below for pedigree. Of note: - Sister with breast cancer at 61 - Maternal great-aunt with breast cancer - Maternal great-grandmother with breast cancer  The remaining family history is unremarkable. According to the patient there are no other known cases of significant cancers in the family. To her knowledge no one else in the family has had germline testing for cancer susceptibility.   TEST RESULTS: NEGATIVE    NO pathogenic (disease-causing) variants or variants of uncertain significance were detected in the following genes: BAL, BARD1, BRCA1, BRCA2, BRIP1, CDH1, CHEK2, DICER1, EPCAM, MLH1, MSH2, MSH6, NF1, PALB2, PMS2, PTEN, RAD51C, RAD51D, SMARCA4, STK11, TP53    RESULTS INTERPRETATION AND ASSESSMENT: Given Ms. Aparicio's current reported personal and family history of cancer, and her negative genetic test results, the following screening guidelines and risk-reducing recommendations were discussed: Breast: Long-term management and surveillance should be based on the patients on- or post-treatment protocol as recommended by her surgeons and/or oncologist. Other: In the absence of other indications, the patient should practice age-appropriate cancer screening for all other organ systems as recommended for the general population.   It is recommended that the patient discuss the importance of pursuing cancer genetic testing and counseling with her other relatives. There may be a pathogenic variant in the family which the patient did not inherit. We would be happy to meet with her family members or refer them to a genetic expert in their area.   We also discussed that, while no clear cause of the patient's personal and family history of cancer was identified, this result, while reassuring, does not entirely rule out a hereditary cancer risk in the patient. It is possible the patient has a mutation in one of the genes tested that is not detectable by this analysis, or has a mutation in a different gene, either known or unknown. It is also possible there is a hereditary cancer predisposition in the family, but the patient did not inherit it.   Our knowledge of genetics and inherited cancer conditions is changing rapidly, therefore, we recommend that the patient contact our office, every 2 to 3 years, to discuss relevant advances in cancer genetics. We emphasize the importance of re-contacting us with updates regarding her personal and family history of cancer as well as any updates regarding additional cancer genetic test results performed for the patient and/or family members.  Such updates could possibly change our risk assessment and recommendations.   PLAN: 1. Long-term management and surveillance should be based on the patient's personal and family history and general population guidelines for all other cancers. 2. A copy of the genetic test results is available to the patient in the [facility] portal. 3. The patient was encouraged to contact us every 2-3 years to discuss relevant advances in cancer genetics, or sooner if there are any changes in her personal or family history of cancer.   For any additional questions please call Cancer Genetics at [phone] or [phone].   Veronica Perez MS, [facility]  Genetic Counselor, Cancer Genetics

## 2024-08-18 NOTE — HISTORY OF PRESENT ILLNESS
[de-identified] : 60 y/o F PMHx hypothyroidism, asthma, uterine fibroids s/p hysterectomy who is presenting to clinic for initial consultation for newly diagnosed right breast DCIS, ER/SD (+) and Left breast BIRADS3 abnormality since 7.2023  Her workup is as follows: In 7.2024 - Diagnostic Mammogram: There are grouped heterogeneous calcifications in superior aspect of the right breast. Stereotactic biopsy recommended. No suspicious mass, microcalcifications or areas of architectural distortion seen in the left breast.   - Ultrasound: Bilateral whole breast ultrasound was performed. At 2:00 N7 of the left breast there is a stable hypoechoic mass measuring 0.5 x 0.4 x 0.3 cm. Short interval follow-up recommended. At 2:00 N 5-6 there is a stable hypoechoic mass measuring 0.5 x 0.4 x 0.2 cm. Short interval follow-up recommended. At 2:00 N5 there is a stable hypoechoic mass measuring 0.8 x 0.7 x 0.3 cm. Short interval follow-up recommended. There is no other solid or cystic mass noted in either breast. No right or left axillary lymphadenopathy.    On 07/25/2024 she had right breast calcifications stereotactic Guided Vacuum-Assisted Needle Core Biopsies: (mini-cork)  - Ductal Carcinoma In Situ (Dcis), Solid And Cribriform Types With Comedonecrosis And Calcifications, High Nuclear Grade.  ESTROGEN RECEPTOR  99 STRONG (3+)  PROGESTERONE RECEPTOR 80 MODERATE/STRONG (2+/3+)   The patient has a history of left excisional biopsy at age 44 - benign. She has family history of breast cancer in her sister at age 57, and maternal grandmother at age 65.

## 2024-08-18 NOTE — ASSESSMENT
[FreeTextEntry1] : 58 y/o F PMHx hypothyroidism, asthma, uterine fibroids s/p hysterectomy who is presenting to clinic for initial consultation for newly diagnosed right breast DCIS, ER/UT (+).  We had a detailed discussion regarding her diagnosis and pathology report was reviewed. We explained that standard of care for DCIS is to first undergo surgery either BCS or mastectomy and once surgical pathology report confirms absence of any invasive carcinoma then the next step for treatment of DCIS would be discussion of adjuvant radiotherapy and endocrine therapy. We reviewed prognosis and role of adjuvant endocrine therapy in ER positive DCIS.  We discussed adjuvant endocrine therapy to reduce risk of recurrence and prevent development of new breast cancer. Stefani is postmenopausal and she may have options to take Tamoxifen or Anastrozole. In the randomized clinical trial, Anastrozole demonstrated comparable efficacy to Tamoxifen but has better side effect profile. The main side effects associated with Anastrozole are muscular and skeletal aching, arthralgia, loss of bone density, osteopenia and osteoporosis, a small increase risk of cardiovascular events and slightly increase of hyperlipidemia. Tamoxifen has a small increased risk for endodermal cancer, increased risk for thrombosis, vasomotor symptoms, depression and cataract.   We will go over the adjuvant systemic therapy options with her again after surgery and when the pathology report is available. .   She was referred for genetic testing by breast surgeon. Will followup the result.   All questions were answered. She agreed with the plan.  RTO in 1 month or after surgery.

## 2024-08-18 NOTE — OB HISTORY
[Currently In Menopause] : currently in menopause [Menopause Age: ____] : age at menopause was [unfilled] [Menarche Age: ____] : age at menarche was [unfilled]

## 2024-08-18 NOTE — PHYSICAL EXAM
[Fully active, able to carry on all pre-disease performance without restriction] : Status 0 - Fully active, able to carry on all pre-disease performance without restriction [Normal] : affect appropriate [de-identified] : small post biopsy mass in right breast. No nipple discharge, no skin changes bilaterally.

## 2024-08-20 ENCOUNTER — NON-APPOINTMENT (OUTPATIENT)
Age: 59
End: 2024-08-20

## 2024-08-21 ENCOUNTER — NON-APPOINTMENT (OUTPATIENT)
Age: 59
End: 2024-08-21

## 2024-08-21 DIAGNOSIS — N60.82 OTHER BENIGN MAMMARY DYSPLASIAS OF LEFT BREAST: ICD-10-CM

## 2024-08-21 DIAGNOSIS — N64.89 OTHER SPECIFIED DISORDERS OF BREAST: ICD-10-CM

## 2024-08-21 DIAGNOSIS — N60.22 FIBROADENOSIS OF LEFT BREAST: ICD-10-CM

## 2024-08-21 DIAGNOSIS — N63.20 UNSPECIFIED LUMP IN THE LEFT BREAST, UNSPECIFIED QUADRANT: ICD-10-CM

## 2024-08-21 PROBLEM — D05.11 INTRADUCTAL CARCINOMA IN SITU OF RIGHT BREAST: Chronic | Status: ACTIVE | Noted: 2024-08-13

## 2024-08-23 ENCOUNTER — APPOINTMENT (OUTPATIENT)
Age: 59
End: 2024-08-23

## 2024-08-26 ENCOUNTER — RESULT REVIEW (OUTPATIENT)
Age: 59
End: 2024-08-26

## 2024-08-26 ENCOUNTER — OUTPATIENT (OUTPATIENT)
Dept: OUTPATIENT SERVICES | Facility: HOSPITAL | Age: 59
LOS: 1 days | End: 2024-08-26
Payer: COMMERCIAL

## 2024-08-26 DIAGNOSIS — E89.0 POSTPROCEDURAL HYPOTHYROIDISM: Chronic | ICD-10-CM

## 2024-08-26 DIAGNOSIS — R92.8 OTHER ABNORMAL AND INCONCLUSIVE FINDINGS ON DIAGNOSTIC IMAGING OF BREAST: ICD-10-CM

## 2024-08-26 DIAGNOSIS — Z90.711 ACQUIRED ABSENCE OF UTERUS WITH REMAINING CERVICAL STUMP: Chronic | ICD-10-CM

## 2024-08-26 PROCEDURE — 19281 PERQ DEVICE BREAST 1ST IMAG: CPT | Mod: RT

## 2024-08-26 PROCEDURE — 19282 PERQ DEVICE BREAST EA IMAG: CPT | Mod: LT

## 2024-08-26 PROCEDURE — 99261: CPT | Mod: FB

## 2024-08-27 ENCOUNTER — OUTPATIENT (OUTPATIENT)
Dept: OUTPATIENT SERVICES | Facility: HOSPITAL | Age: 59
LOS: 1 days | Discharge: HOME | End: 2024-08-27
Payer: COMMERCIAL

## 2024-08-27 ENCOUNTER — APPOINTMENT (OUTPATIENT)
Dept: BREAST CENTER | Facility: AMBULATORY SURGERY CENTER | Age: 59
End: 2024-08-27

## 2024-08-27 ENCOUNTER — RESULT REVIEW (OUTPATIENT)
Age: 59
End: 2024-08-27

## 2024-08-27 VITALS
WEIGHT: 171.96 LBS | SYSTOLIC BLOOD PRESSURE: 123 MMHG | RESPIRATION RATE: 18 BRPM | HEART RATE: 57 BPM | OXYGEN SATURATION: 99 % | TEMPERATURE: 98 F | HEIGHT: 67 IN | DIASTOLIC BLOOD PRESSURE: 79 MMHG

## 2024-08-27 VITALS
SYSTOLIC BLOOD PRESSURE: 149 MMHG | HEART RATE: 61 BPM | OXYGEN SATURATION: 99 % | DIASTOLIC BLOOD PRESSURE: 83 MMHG | RESPIRATION RATE: 17 BRPM

## 2024-08-27 DIAGNOSIS — D24.2 BENIGN NEOPLASM OF LEFT BREAST: ICD-10-CM

## 2024-08-27 DIAGNOSIS — Z90.711 ACQUIRED ABSENCE OF UTERUS WITH REMAINING CERVICAL STUMP: Chronic | ICD-10-CM

## 2024-08-27 DIAGNOSIS — N64.89 OTHER SPECIFIED DISORDERS OF BREAST: ICD-10-CM

## 2024-08-27 DIAGNOSIS — D05.11 INTRADUCTAL CARCINOMA IN SITU OF RIGHT BREAST: ICD-10-CM

## 2024-08-27 DIAGNOSIS — E03.9 HYPOTHYROIDISM, UNSPECIFIED: ICD-10-CM

## 2024-08-27 DIAGNOSIS — Z79.890 HORMONE REPLACEMENT THERAPY: ICD-10-CM

## 2024-08-27 DIAGNOSIS — E89.0 POSTPROCEDURAL HYPOTHYROIDISM: Chronic | ICD-10-CM

## 2024-08-27 DIAGNOSIS — N60.12 DIFFUSE CYSTIC MASTOPATHY OF LEFT BREAST: ICD-10-CM

## 2024-08-27 DIAGNOSIS — Z88.2 ALLERGY STATUS TO SULFONAMIDES: ICD-10-CM

## 2024-08-27 DIAGNOSIS — N60.11 DIFFUSE CYSTIC MASTOPATHY OF RIGHT BREAST: ICD-10-CM

## 2024-08-27 DIAGNOSIS — N60.81 OTHER BENIGN MAMMARY DYSPLASIAS OF RIGHT BREAST: ICD-10-CM

## 2024-08-27 PROCEDURE — 88305 TISSUE EXAM BY PATHOLOGIST: CPT

## 2024-08-27 PROCEDURE — 88307 TISSUE EXAM BY PATHOLOGIST: CPT

## 2024-08-27 PROCEDURE — 0546T RF SPECTRSC NTRAOP MRGN ASMT: CPT

## 2024-08-27 PROCEDURE — 88305 TISSUE EXAM BY PATHOLOGIST: CPT | Mod: 26

## 2024-08-27 PROCEDURE — 0546T RF SPECTRSC NTRAOP MRGN ASMT: CPT | Mod: AS

## 2024-08-27 PROCEDURE — C1889: CPT

## 2024-08-27 PROCEDURE — 19301 PARTIAL MASTECTOMY: CPT | Mod: XS,LT

## 2024-08-27 PROCEDURE — 14001 TIS TRNFR TRUNK 10.1-30SQCM: CPT

## 2024-08-27 PROCEDURE — 88307 TISSUE EXAM BY PATHOLOGIST: CPT | Mod: 26

## 2024-08-27 RX ORDER — HYDROMORPHONE HYDROCHLORIDE 2 MG/1
1 TABLET ORAL
Refills: 0 | Status: DISCONTINUED | OUTPATIENT
Start: 2024-08-27 | End: 2024-08-27

## 2024-08-27 RX ORDER — ONDANSETRON 2 MG/ML
4 INJECTION, SOLUTION INTRAMUSCULAR; INTRAVENOUS ONCE
Refills: 0 | Status: DISCONTINUED | OUTPATIENT
Start: 2024-08-27 | End: 2024-08-27

## 2024-08-27 RX ORDER — HYDROMORPHONE HYDROCHLORIDE 2 MG/1
0.5 TABLET ORAL
Refills: 0 | Status: DISCONTINUED | OUTPATIENT
Start: 2024-08-27 | End: 2024-08-27

## 2024-08-27 RX ADMIN — HYDROMORPHONE HYDROCHLORIDE 0.5 MILLIGRAM(S): 2 TABLET ORAL at 09:48

## 2024-08-27 RX ADMIN — HYDROMORPHONE HYDROCHLORIDE 0.5 MILLIGRAM(S): 2 TABLET ORAL at 09:31

## 2024-08-27 RX ADMIN — Medication 100 MILLILITER(S): at 09:43

## 2024-08-27 NOTE — BRIEF OPERATIVE NOTE - NSICDXBRIEFPOSTOP_GEN_ALL_CORE_FT
POST-OP DIAGNOSIS:  Atypical hyperplasia of left breast 27-Aug-2024 09:09:55  Reyes, Josephine  Ductal carcinoma of right breast 27-Aug-2024 09:10:03  Reyes, Josephine

## 2024-08-27 NOTE — BRIEF OPERATIVE NOTE - NSICDXBRIEFPREOP_GEN_ALL_CORE_FT
PRE-OP DIAGNOSIS:  Atypical ductal hyperplasia of left breast 27-Aug-2024 09:07:49  Reyes, Josephine  Ductal carcinoma of right breast 27-Aug-2024 09:08:11  Reyes, Josephine

## 2024-08-27 NOTE — CHART NOTE - NSCHARTNOTEFT_GEN_A_CORE
PACU ANESTHESIA ADMISSION NOTE      Procedure: Lumpectomy, breast, bilateral      Post op diagnosis:  Atypical hyperplasia of left breast    Ductal carcinoma of right breast        ____  Intubated  TV:______       Rate: ______      FiO2: ______    ____  Patent Airway    ____  Full return of protective reflexes    __x__  Full recovery from anesthesia / back to baseline     Vitals:   T: 97.6          R:18                  BP: 144/88                 Sat: 99                  P: 69      Mental Status:  __x__ Awake   _____ Alert   _____ Drowsy   _____ Sedated    Nausea/Vomiting:  _x___ NO  ______Yes,   See Post - Op Orders          Pain Scale (0-10):  ___0__    Treatment: ____ None    ____ See Post - Op/PCA Orders    Post - Operative Fluids:   _x___ Oral   ____ See Post - Op Orders    Plan: Discharge:   _x___Home       _____Floor     _____Critical Care    _____  Other:_________________    Comments:

## 2024-08-27 NOTE — ASU DISCHARGE PLAN (ADULT/PEDIATRIC) - CARE PROVIDER_API CALL
Lillie Bowlinghran  Surgery  [address] [place] [address], [address] [place] [address]  Phone: [phone]  Fax: [phone]  Follow Up Time: 2 weeks

## 2024-08-27 NOTE — ASU DISCHARGE PLAN (ADULT/PEDIATRIC) - NS MD DC FALL RISK RISK
For information on Fall & Injury Prevention, visit: [email or web address] OR  [email or web address] OR  https://www.cdc.gov/steadi/patient.html

## 2024-08-28 ENCOUNTER — NON-APPOINTMENT (OUTPATIENT)
Age: 59
End: 2024-08-28

## 2024-08-30 DIAGNOSIS — Z88.2 ALLERGY STATUS TO SULFONAMIDES: ICD-10-CM

## 2024-08-30 DIAGNOSIS — R92.8 OTHER ABNORMAL AND INCONCLUSIVE FINDINGS ON DIAGNOSTIC IMAGING OF BREAST: ICD-10-CM

## 2024-09-04 PROBLEM — Z80.3 FAMILY HISTORY OF BREAST CANCER: Status: ACTIVE | Noted: 2024-09-04

## 2024-09-09 ENCOUNTER — APPOINTMENT (OUTPATIENT)
Dept: BREAST CENTER | Facility: CLINIC | Age: 59
End: 2024-09-09
Payer: COMMERCIAL

## 2024-09-09 DIAGNOSIS — Z80.3 FAMILY HISTORY OF MALIGNANT NEOPLASM OF BREAST: ICD-10-CM

## 2024-09-09 DIAGNOSIS — N60.92 UNSPECIFIED BENIGN MAMMARY DYSPLASIA OF LEFT BREAST: ICD-10-CM

## 2024-09-09 PROCEDURE — 99024 POSTOP FOLLOW-UP VISIT: CPT

## 2024-09-09 NOTE — HISTORY OF PRESENT ILLNESS
[FreeTextEntry1] : Stefani Aparicio is a 58 y/o F with a history of right breast DCIS, ER/SC (+) and LEFT breast BIRADS3 abnormality and a PSHx of right lumpectomy. Her pathology results showed Left breast biopsy - Atypia. She presents today for a follow up. pt complains of Seroma underneath the right breast. pt was assured that it will be absorbed. Pt does not have any other complains at this time.    Her imaging is as follows: 7/11/2024 b/l dx mammo and UA -->birads4 breasts are heterogeneously dense grouped heterogeneous calcifications in superior aspect of the right breast--> Stereotactic biopsy recommended.  left US: - 2:00 N7 of the left breast there is a stable hypoechoic mass measuring 0.5 x 0.4 x 0.3 cm-->Short interval follow-up recommended. At 2:00 N 5-6 there is a stable hypoechoic mass measuring 0.5 x 0.4 x 0.2 cm. Short interval follow-up recommended. At 2:00 N5 there is a stable hypoechoic mass measuring 0.8 x 0.7 x 0.3 cm. Short interval follow-up recommended.   07/25/2024 Breast, Right Calcifications, Stereotactic Guided Vacuum-Assisted Needle Core Biopsies: (mini-cork) - Ductal Carcinoma In Situ (Dcis), Solid And Cribriform Types With Comedonecrosis And Calcifications, High Nuclear Grade.  Findings are malignant and concordant. ESTROGEN RECEPTOR  99 STRONG (3+) PROGESTERONE RECEPTOR 80 MODERATE/STRONG (2+/3+)  08/26/2024 BILATERAL MAMMO AND US RIGHT breast: -Using the usual sterile technique and mammographic guidance the right breast marker was localized with biopsy grid.   -A unilateral right digital mammogram demonstrates appropriate positioning of the intuition tag.  LEFT breast: -Using the usual sterile technique and mammographic guidance the left breast marker was localized.  -A unilateral left digital mammogram demonstrates appropriate positioning of the pintuition tag.  -The patient tolerated the procedure well and left the department in good condition.  08/27/2024 PATHOLOGY Final Diagnosis 1.  Breast, left 2:00 N5-7 mass, localized lumpectomy: - Focus of atypical ductal hyperplasia (ADH), solid and cribriform types arising in the background of a complex sclerosing lesion (radial scar) adjacent to prior biopsy site changes. - The epithelial atypia is located 6.0 mm from the nearest medial surgical margin (microscopic measurement). - Myxoid fibroadenoma, nodular cystic/papillary apocrine metaplasia, mammary duct ectasia, vascular medial calcific sclerosis (Monckeberg's), and proliferative type fibrocystic changes associated with phosphate microcalcifications.  2.  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 - For final surgical margin status please see specimen parts #3-#8 below. - Proliferative type fibrocystic changes, nodular cystic/papillary apocrine metaplasia, and vascular medial calcific sclerosis (Monckeberg's). - For hormone receptor protein expression status please see the pathology report from the prior needle core biopsy specimen ([id number]). - AJCC 8th Edition Pathologic Stage: pTis (DCIS), pNx, pMx.  3.  Breast, right anterior margin, excision: - Benign fatty breast tissue without histopathologic abnormality. Negative for carcinoma.  4.  Breast, right inferior margin, excision: - Benign fatty breast tissue with focal cystic/papillary apocrine metaplasia.  Negative for carcinoma.  5.  Breast, right lateral margin, excision: - Benign fatty breast tissue without histopathologic abnormality. Negative for carcinoma.  6.  Breast, right medial margin, excision: - Benign fibrofatty breast tissue without histopathologic abnormality.  Negative for carcinoma.  7.  Breast, right posterior margin, excision: - Benign fatty breast tissue with focal prior biopsy site changes. Negative for carcinoma.  8.  Breast, right superior margin, excision: - Benign fibrofatty breast tissue without histopathologic abnormality. Negative for carcinoma.

## 2024-09-09 NOTE — HISTORY OF PRESENT ILLNESS
[FreeTextEntry1] : Stefani Aparicio is a 58 y/o F with a history of right breast DCIS, ER/HI (+) and LEFT breast BIRADS3 abnormality and a PSHx of right lumpectomy. Her pathology results showed Left breast biopsy - Atypia. She presents today for a follow up. pt complains of Seroma underneath the right breast. pt was assured that it will be absorbed. Pt does not have any other complains at this time.    Her imaging is as follows: 7/11/2024 b/l dx mammo and UA -->birads4 breasts are heterogeneously dense grouped heterogeneous calcifications in superior aspect of the right breast--> Stereotactic biopsy recommended.  left US: - 2:00 N7 of the left breast there is a stable hypoechoic mass measuring 0.5 x 0.4 x 0.3 cm-->Short interval follow-up recommended. At 2:00 N 5-6 there is a stable hypoechoic mass measuring 0.5 x 0.4 x 0.2 cm. Short interval follow-up recommended. At 2:00 N5 there is a stable hypoechoic mass measuring 0.8 x 0.7 x 0.3 cm. Short interval follow-up recommended.   07/25/2024 Breast, Right Calcifications, Stereotactic Guided Vacuum-Assisted Needle Core Biopsies: (mini-cork) - Ductal Carcinoma In Situ (Dcis), Solid And Cribriform Types With Comedonecrosis And Calcifications, High Nuclear Grade.  Findings are malignant and concordant. ESTROGEN RECEPTOR  99 STRONG (3+) PROGESTERONE RECEPTOR 80 MODERATE/STRONG (2+/3+)  08/26/2024 BILATERAL MAMMO AND US RIGHT breast: -Using the usual sterile technique and mammographic guidance the right breast marker was localized with biopsy grid.   -A unilateral right digital mammogram demonstrates appropriate positioning of the intuition tag.  LEFT breast: -Using the usual sterile technique and mammographic guidance the left breast marker was localized.  -A unilateral left digital mammogram demonstrates appropriate positioning of the pintuition tag.  -The patient tolerated the procedure well and left the department in good condition.  08/27/2024 PATHOLOGY Final Diagnosis 1.  Breast, left 2:00 N5-7 mass, localized lumpectomy: - Focus of atypical ductal hyperplasia (ADH), solid and cribriform types arising in the background of a complex sclerosing lesion (radial scar) adjacent to prior biopsy site changes. - The epithelial atypia is located 6.0 mm from the nearest medial surgical margin (microscopic measurement). - Myxoid fibroadenoma, nodular cystic/papillary apocrine metaplasia, mammary duct ectasia, vascular medial calcific sclerosis (Monckeberg's), and proliferative type fibrocystic changes associated with phosphate microcalcifications.  2.  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 - For final surgical margin status please see specimen parts #3-#8 below. - Proliferative type fibrocystic changes, nodular cystic/papillary apocrine metaplasia, and vascular medial calcific sclerosis (Monckeberg's). - For hormone receptor protein expression status please see the pathology report from the prior needle core biopsy specimen ([id number]). - AJCC 8th Edition Pathologic Stage: pTis (DCIS), pNx, pMx.  3.  Breast, right anterior margin, excision: - Benign fatty breast tissue without histopathologic abnormality. Negative for carcinoma.  4.  Breast, right inferior margin, excision: - Benign fatty breast tissue with focal cystic/papillary apocrine metaplasia.  Negative for carcinoma.  5.  Breast, right lateral margin, excision: - Benign fatty breast tissue without histopathologic abnormality. Negative for carcinoma.  6.  Breast, right medial margin, excision: - Benign fibrofatty breast tissue without histopathologic abnormality.  Negative for carcinoma.  7.  Breast, right posterior margin, excision: - Benign fatty breast tissue with focal prior biopsy site changes. Negative for carcinoma.  8.  Breast, right superior margin, excision: - Benign fibrofatty breast tissue without histopathologic abnormality. Negative for carcinoma.

## 2024-09-09 NOTE — DATA REVIEWED
[FreeTextEntry1] : 08/26/2024  MAMMO AND US   INTERPRETATION:  Clinical History / Reason for exam: Bilateral Pintuition placement.  Images were reviewed. The patient safety checklist, including time out procedure, was used.  Right breast: Using the usual sterile technique and mammographic guidance the right breast marker was localized with biopsy grid.  A unilateral right digital mammogram demonstrates appropriate positioning of the intuition tag.  Left breast: Using the usual sterile technique and mammographic guidance the left breast marker was localized. A unilateral left digital mammogram demonstrates appropriate positioning of the pintuition tag.  The patient tolerated the procedure well and left the department in good condition.  Impression: Successful Pintution tag placement in both breasts.    08/27/2024  PATHOLOGY  Final Diagnosis 1.  Breast, left 2:00 N5-7 mass, localized lumpectomy: - Focus of atypical ductal hyperplasia (ADH), solid and cribriform types arising in the background of a complex sclerosing lesion (radial scar) adjacent to prior biopsy site changes. - The epithelial atypia is located 6.0 mm from the nearest medial surgical margin (microscopic measurement). - Myxoid fibroadenoma, nodular cystic/papillary apocrine metaplasia, mammary duct ectasia, vascular medial calcific sclerosis (Monckeberg's), and proliferative type fibrocystic changes associated with phosphate microcalcifications.  2.  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 - For final surgical margin status please see specimen parts #3-#8 below. - Proliferative type fibrocystic changes, nodular cystic/papillary apocrine metaplasia, and vascular medial calcific sclerosis (Monckeberg's). - For hormone receptor protein expression status please see the pathology report from the prior needle core biopsy specimen ([id number]). - AJCC 8th Edition Pathologic Stage: pTis (DCIS), pNx, pMx.  3.  Breast, right anterior margin, excision: - Benign fatty breast tissue without histopathologic abnormality. Negative for carcinoma.  4.  Breast, right inferior margin, excision: - Benign fatty breast tissue with focal cystic/papillary apocrine metaplasia.  Negative for carcinoma.  5.  Breast, right lateral margin, excision: - Benign fatty breast tissue without histopathologic abnormality. Negative for carcinoma.  6.  Breast, right medial margin, excision: - Benign fibrofatty breast tissue without histopathologic abnormality.  Negative for carcinoma.  7.  Breast, right posterior margin, excision: - Benign fatty breast tissue with focal prior biopsy site changes. Negative for carcinoma.  8.  Breast, right superior margin, excision: - Benign fibrofatty breast tissue without histopathologic abnormality. Negative for carcinoma.

## 2024-09-09 NOTE — PAST MEDICAL HISTORY
[Total Preg ___] : G[unfilled] [Age At Live Birth ___] : Age at live birth: [unfilled] [FreeTextEntry6] : Partial Hysterectomy [FreeTextEntry7] : NONE

## 2024-09-09 NOTE — ADDENDUM
[FreeTextEntry1] :  Documented by Magaly Manley acting as a scribe for Dr. CHRISTIAN [facility] on 09/09/2024.

## 2024-09-09 NOTE — ADDENDUM
[FreeTextEntry1] :  Documented by Magaly Manley acting as a scribe for Dr. CHRISTIAN [facility] on 09/09/2024.

## 2024-09-09 NOTE — DATA REVIEWED
[FreeTextEntry1] : 08/26/2024  MAMMO AND US   INTERPRETATION:  Clinical History / Reason for exam: Bilateral Pintuition placement.  Images were reviewed. The patient safety checklist, including time out procedure, was used.  Right breast: Using the usual sterile technique and mammographic guidance the right breast marker was localized with biopsy grid.  A unilateral right digital mammogram demonstrates appropriate positioning of the intuition tag.  Left breast: Using the usual sterile technique and mammographic guidance the left breast marker was localized. A unilateral left digital mammogram demonstrates appropriate positioning of the pintuition tag.  The patient tolerated the procedure well and left the department in good condition.  Impression: Successful Pintution tag placement in both breasts.    08/27/2024  PATHOLOGY  Final Diagnosis 1.  Breast, left 2:00 N5-7 mass, localized lumpectomy: - Focus of atypical ductal hyperplasia (ADH), solid and cribriform types arising in the background of a complex sclerosing lesion (radial scar) adjacent to prior biopsy site changes. - The epithelial atypia is located 6.0 mm from the nearest medial surgical margin (microscopic measurement). - Myxoid fibroadenoma, nodular cystic/papillary apocrine metaplasia, mammary duct ectasia, vascular medial calcific sclerosis (Monckeberg's), and proliferative type fibrocystic changes associated with phosphate microcalcifications.  2.  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 - For final surgical margin status please see specimen parts #3-#8 below. - Proliferative type fibrocystic changes, nodular cystic/papillary apocrine metaplasia, and vascular medial calcific sclerosis (Monckeberg's). - For hormone receptor protein expression status please see the pathology report from the prior needle core biopsy specimen ([id number]). - AJCC 8th Edition Pathologic Stage: pTis (DCIS), pNx, pMx.  3.  Breast, right anterior margin, excision: - Benign fatty breast tissue without histopathologic abnormality. Negative for carcinoma.  4.  Breast, right inferior margin, excision: - Benign fatty breast tissue with focal cystic/papillary apocrine metaplasia.  Negative for carcinoma.  5.  Breast, right lateral margin, excision: - Benign fatty breast tissue without histopathologic abnormality. Negative for carcinoma.  6.  Breast, right medial margin, excision: - Benign fibrofatty breast tissue without histopathologic abnormality.  Negative for carcinoma.  7.  Breast, right posterior margin, excision: - Benign fatty breast tissue with focal prior biopsy site changes. Negative for carcinoma.  8.  Breast, right superior margin, excision: - Benign fibrofatty breast tissue without histopathologic abnormality. Negative for carcinoma.

## 2024-09-09 NOTE — END OF VISIT
[FreeTextEntry4] :  All medical record entries made by the ruth were at my, JOSHUA Schultz, direction and personally dictated by me on 09/09/2024. I have reviewed the chart and agreed that the record accurately reflects my personal performance of the history, physical examination, assessment and plan. I have also personally directed, reviewed and agreed with the chart.

## 2024-09-11 ENCOUNTER — NON-APPOINTMENT (OUTPATIENT)
Age: 59
End: 2024-09-11

## 2024-09-12 ENCOUNTER — NON-APPOINTMENT (OUTPATIENT)
Age: 59
End: 2024-09-12

## 2024-09-18 ENCOUNTER — APPOINTMENT (OUTPATIENT)
Dept: RADIATION ONCOLOGY | Facility: HOSPITAL | Age: 59
End: 2024-09-18
Payer: COMMERCIAL

## 2024-09-18 ENCOUNTER — OUTPATIENT (OUTPATIENT)
Dept: OUTPATIENT SERVICES | Facility: HOSPITAL | Age: 59
LOS: 1 days | End: 2024-09-18
Payer: COMMERCIAL

## 2024-09-18 ENCOUNTER — APPOINTMENT (OUTPATIENT)
Age: 59
End: 2024-09-18

## 2024-09-18 VITALS
BODY MASS INDEX: 26.68 KG/M2 | HEART RATE: 64 BPM | TEMPERATURE: 98.1 F | SYSTOLIC BLOOD PRESSURE: 138 MMHG | OXYGEN SATURATION: 98 % | WEIGHT: 170 LBS | HEIGHT: 67 IN | DIASTOLIC BLOOD PRESSURE: 81 MMHG | RESPIRATION RATE: 16 BRPM

## 2024-09-18 VITALS
BODY MASS INDEX: 26.68 KG/M2 | SYSTOLIC BLOOD PRESSURE: 135 MMHG | TEMPERATURE: 98.1 F | HEIGHT: 67 IN | HEART RATE: 67 BPM | WEIGHT: 170 LBS | RESPIRATION RATE: 16 BRPM | OXYGEN SATURATION: 99 % | DIASTOLIC BLOOD PRESSURE: 94 MMHG

## 2024-09-18 DIAGNOSIS — Z90.711 ACQUIRED ABSENCE OF UTERUS WITH REMAINING CERVICAL STUMP: Chronic | ICD-10-CM

## 2024-09-18 DIAGNOSIS — E89.0 POSTPROCEDURAL HYPOTHYROIDISM: Chronic | ICD-10-CM

## 2024-09-18 DIAGNOSIS — N60.92 UNSPECIFIED BENIGN MAMMARY DYSPLASIA OF LEFT BREAST: ICD-10-CM

## 2024-09-18 DIAGNOSIS — D05.11 INTRADUCTAL CARCINOMA IN SITU OF RIGHT BREAST: ICD-10-CM

## 2024-09-18 DIAGNOSIS — R92.8 OTHER ABNORMAL AND INCONCLUSIVE FINDINGS ON DIAGNOSTIC IMAGING OF BREAST: ICD-10-CM

## 2024-09-18 PROCEDURE — 99214 OFFICE O/P EST MOD 30 MIN: CPT

## 2024-09-18 PROCEDURE — 99204 OFFICE O/P NEW MOD 45 MIN: CPT

## 2024-09-18 RX ORDER — UBIDECARENONE/VIT E ACET 100MG-5
CAPSULE ORAL
Refills: 0 | Status: ACTIVE | COMMUNITY

## 2024-09-18 RX ORDER — TOCOPHERSOLAN (VITAMIN E TPGS) 400/15ML
LIQUID (ML) ORAL
Refills: 0 | Status: ACTIVE | COMMUNITY

## 2024-09-18 NOTE — VITALS
[Date: ____________] : Patient's last distress assessment performed on [unfilled]. [5 - Distress Level] : Distress Level: 5 [Referred Patient  to social work for follow-up] : Patient was referred to social work for follow-up [Patient given social work contact information and resource sheet] : Patient was given social work contact information and resource sheet [Maximal Pain Intensity: 0/10] : 0/10 [90: Able to carry normal activity; minor signs or symptoms of disease.] : 90: Able to carry normal activity; minor signs or symptoms of disease.

## 2024-09-19 DIAGNOSIS — R92.8 OTHER ABNORMAL AND INCONCLUSIVE FINDINGS ON DIAGNOSTIC IMAGING OF BREAST: ICD-10-CM

## 2024-09-19 NOTE — DISEASE MANAGEMENT
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

## 2024-09-19 NOTE — HISTORY OF PRESENT ILLNESS
[FreeTextEntry1] : The patient is a 59-year-old woman who has been monitored with imaging for follow-up of the left breast since July 2023.   Diagnostic mammogram and ultrasound on 7/11/2024 showed there are grouped heterogeneous calcifications in superior aspect of the right breast. Stereotactic biopsy recommended. No suspicious mass, microcalcifications or areas of architectural distortion seen in the left breast . At 2:00 N7 of the left breast there is a stable hypoechoic mass measuring 0.5 x 0.4 x 0.3 cm. Short interval follow-up recommended. At 2:00 N 5-6 there is a stable hypoechoic mass measuring 0.5 x 0.4 x 0.2 cm. Short interval follow-up recommended. At 2:00 N5 there is a stable hypoechoic mass measuring 0.8 x 0.7 x 0.3 cm. Short interval follow-up recommended. There is no other solid or cystic mass noted in either breast. No right or left axillary lymphadenopathy. Impression: Superior right breast grouped heterogeneous calcifications. Stereotactic biopsy recommended. Stable left breast masses as above. Short interval follow-up with ultrasound recommended. Recommendation: Stereotactic guided biopsy. BI-RADS Category 4: Suspicious  On 7/25/2024, she had a biopsy of the right breast abnormality (calcifications) and pathology showed ductal carcinoma in-situ. Solid and cribriform types with comedonecrosis and calcifications, high nuclear grade. It was ER/OK positive.    On 8/9/2024, she had left breast biopsies X2 sites at 2 o'clock and pathology showed benign findings.  Cystic/papillary apocrine metaplasia. Complex sclerosing lesion (radial scar). Cystic/papillary apocrine metaplasia. Proliferative type fibrocystic changes.  On 8/27/2024, She underwent a bilateral lumpectomy with Dr. Bowling.  She was found to have in the left breast @ 2 o'clock N5-7 mass, focus of atypical ductal hyperplasia (ADH), solid and cribriform types arising in the background of a complex sclerosing lesion (radial scar) adjacent to prior biopsy site changes. The epithelial atypia is located 6.0 mm from the nearest medial surgical margin.  Myxoid fibroadenoma, nodular cystic/papillary apocrine metaplasia, mammary duct ectasia, vascular medial calcific sclerosis and proliferative type fibrocystic changes associated with phosphate microcalcifications.  In the right breast, pathology showed ductal carcinoma in-situ, G3, 1/20 blocks.   Surgical margins were negative. ER/OK positive.   She has been doing well since surgery.  She is here to discuss radiation.  Her DCIS on RT score results show 1.4, low.   BRAC 1-2 were negative.   Med/Onc: Dr. Duran 9/18/2024  ACP breast Sx 3/6/2025

## 2024-09-19 NOTE — DISEASE MANAGEMENT
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

## 2024-09-19 NOTE — LETTER CLOSING
[Consult Closing:] : Thank you for allowing me to participate in the care of this patient.  If you have any questions, please do not hesitate to contact me. [Sincerely yours,] : Sincerely yours, [FreeTextEntry3] : Sammie Weldon M.D.   Electronically proofread and signed by:  Sammie Weldon MD Attending, Department of Radiation Medicine [facility]

## 2024-09-19 NOTE — LETTER CLOSING
[Consult Closing:] : Thank you for allowing me to participate in the care of this patient.  If you have any questions, please do not hesitate to contact me. [Sincerely yours,] : Sincerely yours, [FreeTextEntry3] : Sammie Weldon M.D.   Electronically proofread and signed by:  Sammie Weldon MD Attending, Department of Radiation Medicine [facility]

## 2024-09-19 NOTE — REVIEW OF SYSTEMS
[Patient Intake Form Reviewed] : Patient intake form was reviewed [Negative] : Allergic/Immunologic [Fever] : no fever [Chills] : no chills [Fatigue] : no fatigue [Chest Pain] : no chest pain [Palpitations] : no palpitations [Shortness Of Breath] : no shortness of breath

## 2024-09-19 NOTE — PHYSICAL EXAM
[Sclera] : the sclera and conjunctiva were normal [Hearing Threshold Finger Rub Not [place]] : hearing was normal [Heart Rate And Rhythm] : heart rate and rhythm were normal [Heart Sounds] : normal S1 and S2 [Murmurs] : no murmurs present [Edema] : no peripheral edema present [No Discharge] : no discharge [No Masses] : no breast masses were palpable [Abdomen Soft] : soft [Nondistended] : nondistended [Cervical Lymph Nodes Enlarged Posterior Bilaterally] : posterior cervical [Cervical Lymph Nodes Enlarged Anterior Bilaterally] : anterior cervical [Supraclavicular Lymph Nodes Enlarged Bilaterally] : supraclavicular [Nail Clubbing] : no clubbing  or cyanosis of the fingernails [Skin Color & Pigmentation] : normal skin color and pigmentation [No Focal Deficits] : no focal deficits [Motor Exam] : the motor exam was normal [Normal] : no palpable adenopathy [Enlargement Of The Right Breast] : no swelling [Tenderness Of The Right Breast] : no tenderness [___] :  no erythema [Enlargement Of The Left Breast] : no swelling [Tenderness Of The Left Breast] : no tenderness [Axillary Lymph Nodes Enlarged Bilaterally] : no enlarged nodes [de-identified] : left breast mild seroma- improving

## 2024-09-19 NOTE — HISTORY OF PRESENT ILLNESS
[FreeTextEntry1] : The patient is a 59-year-old woman who has been monitored with imaging for follow-up of the left breast since July 2023.   Diagnostic mammogram and ultrasound on 7/11/2024 showed there are grouped heterogeneous calcifications in superior aspect of the right breast. Stereotactic biopsy recommended. No suspicious mass, microcalcifications or areas of architectural distortion seen in the left breast . At 2:00 N7 of the left breast there is a stable hypoechoic mass measuring 0.5 x 0.4 x 0.3 cm. Short interval follow-up recommended. At 2:00 N 5-6 there is a stable hypoechoic mass measuring 0.5 x 0.4 x 0.2 cm. Short interval follow-up recommended. At 2:00 N5 there is a stable hypoechoic mass measuring 0.8 x 0.7 x 0.3 cm. Short interval follow-up recommended. There is no other solid or cystic mass noted in either breast. No right or left axillary lymphadenopathy. Impression: Superior right breast grouped heterogeneous calcifications. Stereotactic biopsy recommended. Stable left breast masses as above. Short interval follow-up with ultrasound recommended. Recommendation: Stereotactic guided biopsy. BI-RADS Category 4: Suspicious  On 7/25/2024, she had a biopsy of the right breast abnormality (calcifications) and pathology showed ductal carcinoma in-situ. Solid and cribriform types with comedonecrosis and calcifications, high nuclear grade. It was ER/MN positive.    On 8/9/2024, she had left breast biopsies X2 sites at 2 o'clock and pathology showed benign findings.  Cystic/papillary apocrine metaplasia. Complex sclerosing lesion (radial scar). Cystic/papillary apocrine metaplasia. Proliferative type fibrocystic changes.  On 8/27/2024, She underwent a bilateral lumpectomy with Dr. Bowling.  She was found to have in the left breast @ 2 o'clock N5-7 mass, focus of atypical ductal hyperplasia (ADH), solid and cribriform types arising in the background of a complex sclerosing lesion (radial scar) adjacent to prior biopsy site changes. The epithelial atypia is located 6.0 mm from the nearest medial surgical margin.  Myxoid fibroadenoma, nodular cystic/papillary apocrine metaplasia, mammary duct ectasia, vascular medial calcific sclerosis and proliferative type fibrocystic changes associated with phosphate microcalcifications.  In the right breast, pathology showed ductal carcinoma in-situ, G3, 1/20 blocks.   Surgical margins were negative. ER/MN positive.   She has been doing well since surgery.  She is here to discuss radiation.  Her DCIS on RT score results show 1.4, low.   BRAC 1-2 were negative.   Med/Onc: Dr. Duran 9/18/2024  ACP breast Sx 3/6/2025

## 2024-09-19 NOTE — PHYSICAL EXAM
[Sclera] : the sclera and conjunctiva were normal [Hearing Threshold Finger Rub Not [place]] : hearing was normal [Heart Rate And Rhythm] : heart rate and rhythm were normal [Heart Sounds] : normal S1 and S2 [Murmurs] : no murmurs present [Edema] : no peripheral edema present [No Discharge] : no discharge [No Masses] : no breast masses were palpable [Abdomen Soft] : soft [Nondistended] : nondistended [Cervical Lymph Nodes Enlarged Posterior Bilaterally] : posterior cervical [Cervical Lymph Nodes Enlarged Anterior Bilaterally] : anterior cervical [Supraclavicular Lymph Nodes Enlarged Bilaterally] : supraclavicular [Nail Clubbing] : no clubbing  or cyanosis of the fingernails [Skin Color & Pigmentation] : normal skin color and pigmentation [No Focal Deficits] : no focal deficits [Motor Exam] : the motor exam was normal [Normal] : no palpable adenopathy [Enlargement Of The Right Breast] : no swelling [Tenderness Of The Right Breast] : no tenderness [___] :  no erythema [Enlargement Of The Left Breast] : no swelling [Tenderness Of The Left Breast] : no tenderness [Axillary Lymph Nodes Enlarged Bilaterally] : no enlarged nodes [de-identified] : left breast mild seroma- improving

## 2024-09-20 DIAGNOSIS — D05.11 INTRADUCTAL CARCINOMA IN SITU OF RIGHT BREAST: ICD-10-CM

## 2024-09-24 ENCOUNTER — NON-APPOINTMENT (OUTPATIENT)
Age: 59
End: 2024-09-24

## 2025-02-12 NOTE — ASU PATIENT PROFILE, ADULT - AS SC BRADEN NUTRITION
24 hour urine test when you are not sick     Next DXA 1/22/2026 or after     Using a pill tray can help you keep track of your medication.  Specifically, if you get a pill tray that has all days of the week (Sunday-Saturday) and also 4 boxes for each day (often labeled as breafkast, lunch, dinner and bedtime) you can use the box to fill your once/day  pills for a whole month.  If you miss a levothyroxine dose you can take 2 the next day.    I recommend we start alendronate 70 mg/WEEK.  Becaues it is poorly absorbed, and because we don't want it to have to compete against other medication or food, and because we want it to get to the intended location in the gut, this medication has to be taken on an empty stomach, with a full glass of water, remaining upright and not eating for 60 minutes.    The goal for osteoporosis treatment is prevention of future fracture and height loss.  There are multiple drugs available for treatment of osteoporosis and the right choice of drug requires consideration of multiple factors.  Most commonly we start with alendronate, which is the generic name for the brand name drug called Fosamax.  Alendronate has been in use for a long time and it has been extensively studied in post menopausal women and in other group.    It is also much less expensive than many of the other possible drugs.  Insurance companies often require that start with alendronate before the will approve other stronger drugs.      To start we often will give alendronate for 5 years, and then consider a drug holiday.      You should make sure your dental care is up to date, and that you get any needed procedures,  prior to starting alendronate.       Dietary supplements are not regulated by the US Food and Drug administration (FDA) in the same way as drugs.  They are under less stringent regulation.  Specifically, the supplement manufacturing firm is given the responsibility to determine if the dietary supplement is  "safe and that any representations or claims made about them are substantiated by adequate evidence to show that they are not false or misleading. This means that dietary supplements do not need approval from FDA before they are marketed. A firm does not have to provide FDA with the evidence it relies on to substantiate safety or effectiveness before or after it markets its products.  Once the product is marketed, FDA has the responsibility for showing that a dietary supplement is \"unsafe,\" before it can take action to restrict the product's use or removal from the marketplace    For more information:  https://www.fda.gov/ForConsumers/ConsumerUpdates/knz306401.htm   " (4) excellent

## 2025-02-28 ENCOUNTER — OUTPATIENT (OUTPATIENT)
Dept: OUTPATIENT SERVICES | Facility: HOSPITAL | Age: 60
LOS: 1 days | End: 2025-02-28
Payer: COMMERCIAL

## 2025-02-28 ENCOUNTER — RESULT REVIEW (OUTPATIENT)
Age: 60
End: 2025-02-28

## 2025-02-28 DIAGNOSIS — Z90.711 ACQUIRED ABSENCE OF UTERUS WITH REMAINING CERVICAL STUMP: Chronic | ICD-10-CM

## 2025-02-28 DIAGNOSIS — E89.0 POSTPROCEDURAL HYPOTHYROIDISM: Chronic | ICD-10-CM

## 2025-02-28 DIAGNOSIS — D05.11 INTRADUCTAL CARCINOMA IN SITU OF RIGHT BREAST: ICD-10-CM

## 2025-02-28 DIAGNOSIS — R92.8 OTHER ABNORMAL AND INCONCLUSIVE FINDINGS ON DIAGNOSTIC IMAGING OF BREAST: ICD-10-CM

## 2025-02-28 PROCEDURE — 77066 DX MAMMO INCL CAD BI: CPT | Mod: 26

## 2025-02-28 PROCEDURE — G0279: CPT | Mod: 26

## 2025-02-28 PROCEDURE — 76642 ULTRASOUND BREAST LIMITED: CPT | Mod: 26,50

## 2025-02-28 PROCEDURE — G0279: CPT

## 2025-02-28 PROCEDURE — 77066 DX MAMMO INCL CAD BI: CPT

## 2025-02-28 PROCEDURE — 76642 ULTRASOUND BREAST LIMITED: CPT | Mod: 50

## 2025-03-01 DIAGNOSIS — R92.8 OTHER ABNORMAL AND INCONCLUSIVE FINDINGS ON DIAGNOSTIC IMAGING OF BREAST: ICD-10-CM

## 2025-03-06 ENCOUNTER — APPOINTMENT (OUTPATIENT)
Dept: BREAST CENTER | Facility: CLINIC | Age: 60
End: 2025-03-06
Payer: COMMERCIAL

## 2025-03-06 VITALS
WEIGHT: 170 LBS | HEIGHT: 67 IN | DIASTOLIC BLOOD PRESSURE: 80 MMHG | SYSTOLIC BLOOD PRESSURE: 132 MMHG | BODY MASS INDEX: 26.68 KG/M2 | HEART RATE: 71 BPM

## 2025-03-06 DIAGNOSIS — N64.4 MASTODYNIA: ICD-10-CM

## 2025-03-06 DIAGNOSIS — R92.30 DENSE BREASTS, UNSPECIFIED: ICD-10-CM

## 2025-03-06 DIAGNOSIS — D05.11 INTRADUCTAL CARCINOMA IN SITU OF RIGHT BREAST: ICD-10-CM

## 2025-03-06 DIAGNOSIS — R92.8 OTHER ABNORMAL AND INCONCLUSIVE FINDINGS ON DIAGNOSTIC IMAGING OF BREAST: ICD-10-CM

## 2025-03-06 DIAGNOSIS — N60.92 UNSPECIFIED BENIGN MAMMARY DYSPLASIA OF LEFT BREAST: ICD-10-CM

## 2025-03-06 PROCEDURE — 99214 OFFICE O/P EST MOD 30 MIN: CPT

## 2025-03-10 ENCOUNTER — APPOINTMENT (OUTPATIENT)
Dept: INTERNAL MEDICINE | Facility: CLINIC | Age: 60
End: 2025-03-10

## 2025-03-10 ENCOUNTER — OUTPATIENT (OUTPATIENT)
Dept: OUTPATIENT SERVICES | Facility: HOSPITAL | Age: 60
LOS: 1 days | End: 2025-03-10
Payer: COMMERCIAL

## 2025-03-10 VITALS
WEIGHT: 182 LBS | BODY MASS INDEX: 28.56 KG/M2 | OXYGEN SATURATION: 99 % | HEART RATE: 70 BPM | SYSTOLIC BLOOD PRESSURE: 133 MMHG | DIASTOLIC BLOOD PRESSURE: 87 MMHG | HEIGHT: 67 IN | TEMPERATURE: 97.2 F

## 2025-03-10 DIAGNOSIS — M25.511 PAIN IN RIGHT SHOULDER: ICD-10-CM

## 2025-03-10 DIAGNOSIS — Z90.711 ACQUIRED ABSENCE OF UTERUS WITH REMAINING CERVICAL STUMP: Chronic | ICD-10-CM

## 2025-03-10 DIAGNOSIS — C50.919 MALIGNANT NEOPLASM OF UNSPECIFIED SITE OF UNSPECIFIED FEMALE BREAST: ICD-10-CM

## 2025-03-10 DIAGNOSIS — E89.0 POSTPROCEDURAL HYPOTHYROIDISM: Chronic | ICD-10-CM

## 2025-03-10 DIAGNOSIS — R73.03 PREDIABETES.: ICD-10-CM

## 2025-03-10 DIAGNOSIS — J45.909 UNSPECIFIED ASTHMA, UNCOMPLICATED: ICD-10-CM

## 2025-03-10 DIAGNOSIS — Z00.00 ENCOUNTER FOR GENERAL ADULT MEDICAL EXAMINATION WITHOUT ABNORMAL FINDINGS: ICD-10-CM

## 2025-03-10 PROCEDURE — 99214 OFFICE O/P EST MOD 30 MIN: CPT

## 2025-03-20 DIAGNOSIS — J45.909 UNSPECIFIED ASTHMA, UNCOMPLICATED: ICD-10-CM

## 2025-03-20 DIAGNOSIS — M25.511 PAIN IN RIGHT SHOULDER: ICD-10-CM

## 2025-03-20 DIAGNOSIS — R73.03 PREDIABETES: ICD-10-CM

## 2025-03-29 ENCOUNTER — OUTPATIENT (OUTPATIENT)
Dept: OUTPATIENT SERVICES | Facility: HOSPITAL | Age: 60
LOS: 1 days | End: 2025-03-29
Payer: COMMERCIAL

## 2025-03-29 DIAGNOSIS — M25.511 PAIN IN RIGHT SHOULDER: ICD-10-CM

## 2025-03-29 DIAGNOSIS — Z90.711 ACQUIRED ABSENCE OF UTERUS WITH REMAINING CERVICAL STUMP: Chronic | ICD-10-CM

## 2025-03-29 DIAGNOSIS — E89.0 POSTPROCEDURAL HYPOTHYROIDISM: Chronic | ICD-10-CM

## 2025-03-29 PROCEDURE — 80061 LIPID PANEL: CPT

## 2025-03-29 PROCEDURE — 84443 ASSAY THYROID STIM HORMONE: CPT

## 2025-03-29 PROCEDURE — 85025 COMPLETE CBC W/AUTO DIFF WBC: CPT

## 2025-03-29 PROCEDURE — 80053 COMPREHEN METABOLIC PANEL: CPT

## 2025-03-29 PROCEDURE — 36415 COLL VENOUS BLD VENIPUNCTURE: CPT

## 2025-03-29 PROCEDURE — 83036 HEMOGLOBIN GLYCOSYLATED A1C: CPT

## 2025-03-30 DIAGNOSIS — M25.511 PAIN IN RIGHT SHOULDER: ICD-10-CM

## 2025-04-03 DIAGNOSIS — Z00.00 ENCOUNTER FOR GENERAL ADULT MEDICAL EXAMINATION W/OUT ABNORMAL FINDINGS: ICD-10-CM

## 2025-04-11 ENCOUNTER — OUTPATIENT (OUTPATIENT)
Dept: OUTPATIENT SERVICES | Facility: HOSPITAL | Age: 60
LOS: 1 days | End: 2025-04-11
Payer: COMMERCIAL

## 2025-04-11 DIAGNOSIS — Z00.00 ENCOUNTER FOR GENERAL ADULT MEDICAL EXAMINATION WITHOUT ABNORMAL FINDINGS: ICD-10-CM

## 2025-04-11 DIAGNOSIS — E89.0 POSTPROCEDURAL HYPOTHYROIDISM: Chronic | ICD-10-CM

## 2025-04-11 DIAGNOSIS — Z90.711 ACQUIRED ABSENCE OF UTERUS WITH REMAINING CERVICAL STUMP: Chronic | ICD-10-CM

## 2025-04-11 PROCEDURE — 86706 HEP B SURFACE ANTIBODY: CPT

## 2025-04-11 PROCEDURE — 86787 VARICELLA-ZOSTER ANTIBODY: CPT

## 2025-04-11 PROCEDURE — 86765 RUBEOLA ANTIBODY: CPT

## 2025-04-11 PROCEDURE — 86480 TB TEST CELL IMMUN MEASURE: CPT

## 2025-04-11 PROCEDURE — 86735 MUMPS ANTIBODY: CPT

## 2025-04-11 PROCEDURE — 86762 RUBELLA ANTIBODY: CPT

## 2025-04-11 PROCEDURE — 36415 COLL VENOUS BLD VENIPUNCTURE: CPT

## 2025-04-11 PROCEDURE — 80307 DRUG TEST PRSMV CHEM ANLYZR: CPT

## 2025-04-12 DIAGNOSIS — Z00.00 ENCOUNTER FOR GENERAL ADULT MEDICAL EXAMINATION WITHOUT ABNORMAL FINDINGS: ICD-10-CM

## 2025-04-12 LAB
MEV IGG FLD QL IA: >300 AU/ML
MEV IGG+IGM SER-IMP: POSITIVE
MUV AB SER-ACNC: POSITIVE
MUV IGG SER QL IA: >300 AU/ML
RUBV IGG FLD-ACNC: 21.6 INDEX
RUBV IGG SER-IMP: POSITIVE
VZV AB TITR SER: POSITIVE
VZV IGG SER IF-ACNC: 13.2 S/CO

## 2025-04-13 LAB — HBV SURFACE AB SERPL IA-ACNC: <3.3 MIU/ML

## 2025-04-21 ENCOUNTER — NON-APPOINTMENT (OUTPATIENT)
Age: 60
End: 2025-04-21

## 2025-04-21 DIAGNOSIS — Z23 ENCOUNTER FOR IMMUNIZATION: ICD-10-CM

## 2025-04-25 ENCOUNTER — OUTPATIENT (OUTPATIENT)
Dept: OUTPATIENT SERVICES | Facility: HOSPITAL | Age: 60
LOS: 1 days | End: 2025-04-25
Payer: COMMERCIAL

## 2025-04-25 ENCOUNTER — APPOINTMENT (OUTPATIENT)
Dept: INTERNAL MEDICINE | Facility: CLINIC | Age: 60
End: 2025-04-25

## 2025-04-25 DIAGNOSIS — Z00.00 ENCOUNTER FOR GENERAL ADULT MEDICAL EXAMINATION WITHOUT ABNORMAL FINDINGS: ICD-10-CM

## 2025-04-25 DIAGNOSIS — E89.0 POSTPROCEDURAL HYPOTHYROIDISM: Chronic | ICD-10-CM

## 2025-04-25 DIAGNOSIS — Z90.711 ACQUIRED ABSENCE OF UTERUS WITH REMAINING CERVICAL STUMP: Chronic | ICD-10-CM

## 2025-04-25 PROCEDURE — 90471 IMMUNIZATION ADMIN: CPT

## 2025-04-25 PROCEDURE — 96372 THER/PROPH/DIAG INJ SC/IM: CPT

## 2025-05-01 DIAGNOSIS — Z23 ENCOUNTER FOR IMMUNIZATION: ICD-10-CM

## 2025-06-09 NOTE — CHIEF COMPLAINT
Patient has a diagnosis of pneumonia. The cause of the pneumonia is unknown at this time. The pneumonia is stable. The patient has the following signs/symptoms of pneumonia: persistent hypoxia , cough, shortness of breath, and chest pain. The patient does have a current oxygen requirement and the patient does have a home oxygen requirement. I have reviewed the pertinent imaging. The following cultures have been collected: Blood cultures and Sputum culture The culture results are listed below.     Current antimicrobial regimen consists of the antibiotics listed below. Will monitor patient closely and continue current treatment plan unchanged.    Antibiotics (From admission, onward)      Start     Stop Route Frequency Ordered    06/09/25 1215  vancomycin 1750 mg in 0.9% sodium chloride 500 mL IVPB  (ED Adult Sepsis Treatment)         -- IV Once 06/09/25 1207            Microbiology Results (last 7 days)       Procedure Component Value Units Date/Time    Culture, Respiratory with Gram Stain [[id number]]     Order Status: Sent Specimen: Respiratory     Blood culture #1 **CANNOT BE ORDERED STAT** [[id number]] Collected: 06/09/25 1226    Order Status: Sent Specimen: Blood from Wrist, Left     Blood culture #2 **CANNOT BE ORDERED STAT** [[id number]] Collected: 06/09/25 1226    Order Status: Sent Specimen: Blood from Peripheral, Antecubital, Right     Influenza A & B by Molecular [[id number]]  (Normal) Collected: 06/09/25 0910    Order Status: Completed Specimen: Nasal Swab Updated: 06/09/25 0955     INFLUENZA A MOLECULAR Negative     INFLUENZA B MOLECULAR  Negative          - 2/4 SIRS criteria: WBC/RR  - blood cx pending  - respiratory cx pending  - HS trop 12, BNP 62  - CTA chest with: mild ill-defined airspace opacities in the dependent portions of the right upper and middle lobes when compared to 05/22/2025, nonspecific.  This could be due to atelectasis, aspiration and/or pneumonia   - duonebs q6hr  - guaifenesin,  Tessalon Perles prn cough  - s/p vanc + zosyn in ED, will continue for now to cover for potential HAP  - SLP consulted for evaluation     [Initial Visit] : initial GYN visit

## 2025-08-26 ENCOUNTER — OUTPATIENT (OUTPATIENT)
Dept: OUTPATIENT SERVICES | Facility: HOSPITAL | Age: 60
LOS: 1 days | End: 2025-08-26
Payer: COMMERCIAL

## 2025-08-26 ENCOUNTER — RESULT REVIEW (OUTPATIENT)
Age: 60
End: 2025-08-26

## 2025-08-26 DIAGNOSIS — Z90.711 ACQUIRED ABSENCE OF UTERUS WITH REMAINING CERVICAL STUMP: Chronic | ICD-10-CM

## 2025-08-26 DIAGNOSIS — R92.8 OTHER ABNORMAL AND INCONCLUSIVE FINDINGS ON DIAGNOSTIC IMAGING OF BREAST: ICD-10-CM

## 2025-08-26 DIAGNOSIS — Z80.3 FAMILY HISTORY OF MALIGNANT NEOPLASM OF BREAST: ICD-10-CM

## 2025-08-26 DIAGNOSIS — Z85.3 PERSONAL HISTORY OF MALIGNANT NEOPLASM OF BREAST: ICD-10-CM

## 2025-08-26 DIAGNOSIS — E89.0 POSTPROCEDURAL HYPOTHYROIDISM: Chronic | ICD-10-CM

## 2025-08-26 DIAGNOSIS — D05.11 INTRADUCTAL CARCINOMA IN SITU OF RIGHT BREAST: ICD-10-CM

## 2025-08-26 PROCEDURE — 77049 MRI BREAST C-+ W/CAD BI: CPT | Mod: 26

## 2025-08-26 PROCEDURE — C8937: CPT

## 2025-08-26 PROCEDURE — A9579: CPT

## 2025-08-26 PROCEDURE — 77049 MRI BREAST C-+ W/CAD BI: CPT

## 2025-08-27 DIAGNOSIS — R92.8 OTHER ABNORMAL AND INCONCLUSIVE FINDINGS ON DIAGNOSTIC IMAGING OF BREAST: ICD-10-CM

## 2025-08-27 DIAGNOSIS — D05.11 INTRADUCTAL CARCINOMA IN SITU OF RIGHT BREAST: ICD-10-CM

## 2025-09-11 ENCOUNTER — APPOINTMENT (OUTPATIENT)
Age: 60
End: 2025-09-11
Payer: COMMERCIAL

## 2025-09-11 ENCOUNTER — RESULT REVIEW (OUTPATIENT)
Age: 60
End: 2025-09-11

## 2025-09-11 PROCEDURE — 76641 ULTRASOUND BREAST COMPLETE: CPT | Mod: 26,50

## 2025-09-11 PROCEDURE — 77065 DX MAMMO INCL CAD UNI: CPT | Mod: 26,RT

## 2025-09-11 PROCEDURE — G0279: CPT | Mod: 26

## 2025-09-15 ENCOUNTER — APPOINTMENT (OUTPATIENT)
Dept: INTERNAL MEDICINE | Facility: CLINIC | Age: 60
End: 2025-09-15

## 2025-09-15 ENCOUNTER — RESULT REVIEW (OUTPATIENT)
Age: 60
End: 2025-09-15

## 2025-09-16 ENCOUNTER — NON-APPOINTMENT (OUTPATIENT)
Age: 60
End: 2025-09-16

## 2025-09-22 PROBLEM — N64.89 RADIAL SCAR OF RIGHT BREAST: Status: ACTIVE | Noted: 2025-09-22
